# Patient Record
Sex: FEMALE | Race: BLACK OR AFRICAN AMERICAN | NOT HISPANIC OR LATINO | Employment: FULL TIME | URBAN - METROPOLITAN AREA
[De-identification: names, ages, dates, MRNs, and addresses within clinical notes are randomized per-mention and may not be internally consistent; named-entity substitution may affect disease eponyms.]

---

## 2020-11-21 ENCOUNTER — APPOINTMENT (OUTPATIENT)
Dept: CT IMAGING | Facility: CLINIC | Age: 20
End: 2020-11-21
Attending: EMERGENCY MEDICINE

## 2020-11-21 ENCOUNTER — HOSPITAL ENCOUNTER (OUTPATIENT)
Facility: CLINIC | Age: 20
Discharge: HOME | End: 2020-11-21
Attending: EMERGENCY MEDICINE | Admitting: EMERGENCY MEDICINE

## 2020-11-21 ENCOUNTER — NURSE TRIAGE (OUTPATIENT)
Dept: NURSING | Facility: CLINIC | Age: 20
End: 2020-11-21

## 2020-11-21 ENCOUNTER — SURGERY (OUTPATIENT)
Age: 20
End: 2020-11-21

## 2020-11-21 ENCOUNTER — ANESTHESIA (OUTPATIENT)
Dept: SURGERY | Facility: CLINIC | Age: 20
End: 2020-11-21

## 2020-11-21 ENCOUNTER — ANESTHESIA EVENT (OUTPATIENT)
Dept: SURGERY | Facility: CLINIC | Age: 20
End: 2020-11-21

## 2020-11-21 VITALS
HEART RATE: 88 BPM | WEIGHT: 145 LBS | OXYGEN SATURATION: 100 % | DIASTOLIC BLOOD PRESSURE: 69 MMHG | HEIGHT: 66 IN | RESPIRATION RATE: 10 BRPM | BODY MASS INDEX: 23.3 KG/M2 | SYSTOLIC BLOOD PRESSURE: 107 MMHG | TEMPERATURE: 98.4 F

## 2020-11-21 DIAGNOSIS — K35.80 ACUTE APPENDICITIS, UNSPECIFIED ACUTE APPENDICITIS TYPE: ICD-10-CM

## 2020-11-21 DIAGNOSIS — R10.31 ABDOMINAL PAIN, RIGHT LOWER QUADRANT: ICD-10-CM

## 2020-11-21 DIAGNOSIS — G89.18 ACUTE POST-OPERATIVE PAIN: Primary | ICD-10-CM

## 2020-11-21 LAB
ALBUMIN SERPL-MCNC: 3.7 G/DL (ref 3.4–5)
ALBUMIN UR-MCNC: 10 MG/DL
ALP SERPL-CCNC: 83 U/L (ref 40–150)
ALT SERPL W P-5'-P-CCNC: 17 U/L (ref 0–50)
ANION GAP SERPL CALCULATED.3IONS-SCNC: 7 MMOL/L (ref 3–14)
APPEARANCE UR: CLEAR
AST SERPL W P-5'-P-CCNC: 24 U/L (ref 0–45)
BACTERIA #/AREA URNS HPF: ABNORMAL /HPF
BASOPHILS # BLD AUTO: 0 10E9/L (ref 0–0.2)
BASOPHILS NFR BLD AUTO: 0.2 %
BILIRUB SERPL-MCNC: 0.5 MG/DL (ref 0.2–1.3)
BILIRUB UR QL STRIP: NEGATIVE
BUN SERPL-MCNC: 7 MG/DL (ref 7–30)
CALCIUM SERPL-MCNC: 8.8 MG/DL (ref 8.5–10.1)
CHLORIDE SERPL-SCNC: 107 MMOL/L (ref 94–109)
CO2 SERPL-SCNC: 22 MMOL/L (ref 20–32)
COLOR UR AUTO: YELLOW
CREAT SERPL-MCNC: 0.6 MG/DL (ref 0.52–1.04)
DIFFERENTIAL METHOD BLD: ABNORMAL
DIFFERENTIAL METHOD BLD: NORMAL
EOSINOPHIL # BLD AUTO: 0 10E9/L (ref 0–0.7)
EOSINOPHIL NFR BLD AUTO: 0.4 %
ERYTHROCYTE [DISTWIDTH] IN BLOOD BY AUTOMATED COUNT: 14.2 % (ref 10–15)
ERYTHROCYTE [DISTWIDTH] IN BLOOD BY AUTOMATED COUNT: NORMAL % (ref 10–15)
GFR SERPL CREATININE-BSD FRML MDRD: >90 ML/MIN/{1.73_M2}
GLUCOSE SERPL-MCNC: 85 MG/DL (ref 70–99)
GLUCOSE UR STRIP-MCNC: NEGATIVE MG/DL
HCG SERPL QL: NEGATIVE
HCT VFR BLD AUTO: 34.8 % (ref 35–47)
HCT VFR BLD AUTO: NORMAL % (ref 35–47)
HGB BLD-MCNC: 11.8 G/DL (ref 11.7–15.7)
HGB BLD-MCNC: NORMAL G/DL (ref 11.7–15.7)
HGB UR QL STRIP: NEGATIVE
IMM GRANULOCYTES # BLD: 0 10E9/L (ref 0–0.4)
IMM GRANULOCYTES NFR BLD: 0.2 %
KETONES UR STRIP-MCNC: 80 MG/DL
LABORATORY COMMENT REPORT: NORMAL
LEUKOCYTE ESTERASE UR QL STRIP: NEGATIVE
LIPASE SERPL-CCNC: 129 U/L (ref 73–393)
LYMPHOCYTES # BLD AUTO: 1.1 10E9/L (ref 0.8–5.3)
LYMPHOCYTES NFR BLD AUTO: 12 %
MCH RBC QN AUTO: 30.2 PG (ref 26.5–33)
MCH RBC QN AUTO: NORMAL PG (ref 26.5–33)
MCHC RBC AUTO-ENTMCNC: 33.9 G/DL (ref 31.5–36.5)
MCHC RBC AUTO-ENTMCNC: NORMAL G/DL (ref 31.5–36.5)
MCV RBC AUTO: 89 FL (ref 78–100)
MCV RBC AUTO: NORMAL FL (ref 78–100)
MONOCYTES # BLD AUTO: 0.8 10E9/L (ref 0–1.3)
MONOCYTES NFR BLD AUTO: 8.6 %
MUCOUS THREADS #/AREA URNS LPF: PRESENT /LPF
NEUTROPHILS # BLD AUTO: 7 10E9/L (ref 1.6–8.3)
NEUTROPHILS NFR BLD AUTO: 78.6 %
NITRATE UR QL: NEGATIVE
NRBC # BLD AUTO: 0 10*3/UL
NRBC BLD AUTO-RTO: 0 /100
PH UR STRIP: 7 PH (ref 5–7)
PLATELET # BLD AUTO: 290 10E9/L (ref 150–450)
PLATELET # BLD AUTO: NORMAL 10E9/L (ref 150–450)
POTASSIUM SERPL-SCNC: 3.8 MMOL/L (ref 3.4–5.3)
PROT SERPL-MCNC: 7.8 G/DL (ref 6.8–8.8)
RBC # BLD AUTO: 3.91 10E12/L (ref 3.8–5.2)
RBC # BLD AUTO: NORMAL 10E12/L (ref 3.8–5.2)
RBC #/AREA URNS AUTO: <1 /HPF (ref 0–2)
SARS-COV-2 RNA SPEC QL NAA+PROBE: NEGATIVE
SARS-COV-2 RNA SPEC QL NAA+PROBE: NORMAL
SODIUM SERPL-SCNC: 136 MMOL/L (ref 133–144)
SOURCE: ABNORMAL
SP GR UR STRIP: 1.03 (ref 1–1.03)
SPECIMEN SOURCE: NORMAL
SPECIMEN SOURCE: NORMAL
SQUAMOUS #/AREA URNS AUTO: 3 /HPF (ref 0–1)
UROBILINOGEN UR STRIP-MCNC: 2 MG/DL (ref 0–2)
WBC # BLD AUTO: 8.9 10E9/L (ref 4–11)
WBC # BLD AUTO: NORMAL 10E9/L (ref 4–11)
WBC #/AREA URNS AUTO: <1 /HPF (ref 0–5)

## 2020-11-21 PROCEDURE — 250N000009 HC RX 250: Performed by: EMERGENCY MEDICINE

## 2020-11-21 PROCEDURE — 250N000009 HC RX 250: Performed by: NURSE ANESTHETIST, CERTIFIED REGISTERED

## 2020-11-21 PROCEDURE — 258N000003 HC RX IP 258 OP 636: Performed by: EMERGENCY MEDICINE

## 2020-11-21 PROCEDURE — U0003 INFECTIOUS AGENT DETECTION BY NUCLEIC ACID (DNA OR RNA); SEVERE ACUTE RESPIRATORY SYNDROME CORONAVIRUS 2 (SARS-COV-2) (CORONAVIRUS DISEASE [COVID-19]), AMPLIFIED PROBE TECHNIQUE, MAKING USE OF HIGH THROUGHPUT TECHNOLOGIES AS DESCRIBED BY CMS-2020-01-R: HCPCS | Performed by: EMERGENCY MEDICINE

## 2020-11-21 PROCEDURE — 250N000011 HC RX IP 250 OP 636: Performed by: EMERGENCY MEDICINE

## 2020-11-21 PROCEDURE — 761N000002 HC RECOVERY PHASE 1 LEVEL 1 EA ADDTL HR: Performed by: SURGERY

## 2020-11-21 PROCEDURE — C9803 HOPD COVID-19 SPEC COLLECT: HCPCS

## 2020-11-21 PROCEDURE — 81001 URINALYSIS AUTO W/SCOPE: CPT | Performed by: EMERGENCY MEDICINE

## 2020-11-21 PROCEDURE — 88304 TISSUE EXAM BY PATHOLOGIST: CPT | Mod: 26 | Performed by: PATHOLOGY

## 2020-11-21 PROCEDURE — 761N000001 HC RECOVERY PHASE 1 LEVEL 1 FIRST HR: Performed by: SURGERY

## 2020-11-21 PROCEDURE — 272N000001 HC OR GENERAL SUPPLY STERILE: Performed by: SURGERY

## 2020-11-21 PROCEDURE — 258N000001 HC RX 258: Performed by: SURGERY

## 2020-11-21 PROCEDURE — 360N000021 HC SURGERY LEVEL 3 EA 15 ADDTL MIN: Performed by: SURGERY

## 2020-11-21 PROCEDURE — 99204 OFFICE O/P NEW MOD 45 MIN: CPT | Mod: 57 | Performed by: SURGERY

## 2020-11-21 PROCEDURE — 96375 TX/PRO/DX INJ NEW DRUG ADDON: CPT

## 2020-11-21 PROCEDURE — 999N000139 HC STATISTIC PRE-PROCEDURE ASSESSMENT II: Performed by: SURGERY

## 2020-11-21 PROCEDURE — 44970 LAPAROSCOPY APPENDECTOMY: CPT | Performed by: SURGERY

## 2020-11-21 PROCEDURE — 258N000003 HC RX IP 258 OP 636: Performed by: NURSE ANESTHETIST, CERTIFIED REGISTERED

## 2020-11-21 PROCEDURE — 88304 TISSUE EXAM BY PATHOLOGIST: CPT | Mod: TC | Performed by: SURGERY

## 2020-11-21 PROCEDURE — 250N000003 HC SEVOFLURANE, EA 15 MIN: Performed by: SURGERY

## 2020-11-21 PROCEDURE — 250N000011 HC RX IP 250 OP 636: Performed by: SURGERY

## 2020-11-21 PROCEDURE — 250N000013 HC RX MED GY IP 250 OP 250 PS 637: Performed by: PHYSICIAN ASSISTANT

## 2020-11-21 PROCEDURE — 370N000001 HC ANESTHESIA TECHNICAL FEE, 1ST 30 MIN: Performed by: SURGERY

## 2020-11-21 PROCEDURE — 96374 THER/PROPH/DIAG INJ IV PUSH: CPT | Mod: 59

## 2020-11-21 PROCEDURE — 44970 LAPAROSCOPY APPENDECTOMY: CPT | Mod: AS | Performed by: PHYSICIAN ASSISTANT

## 2020-11-21 PROCEDURE — 250N000009 HC RX 250: Performed by: SURGERY

## 2020-11-21 PROCEDURE — 83690 ASSAY OF LIPASE: CPT | Performed by: EMERGENCY MEDICINE

## 2020-11-21 PROCEDURE — 258N000003 HC RX IP 258 OP 636: Performed by: SURGERY

## 2020-11-21 PROCEDURE — 99285 EMERGENCY DEPT VISIT HI MDM: CPT | Mod: 25

## 2020-11-21 PROCEDURE — 250N000011 HC RX IP 250 OP 636: Performed by: NURSE ANESTHETIST, CERTIFIED REGISTERED

## 2020-11-21 PROCEDURE — 84703 CHORIONIC GONADOTROPIN ASSAY: CPT | Performed by: EMERGENCY MEDICINE

## 2020-11-21 PROCEDURE — 761N000007 HC RECOVERY PHASE 2 EACH 15 MINS: Performed by: SURGERY

## 2020-11-21 PROCEDURE — 360N000020 HC SURGERY LEVEL 3 1ST 30 MIN: Performed by: SURGERY

## 2020-11-21 PROCEDURE — 80053 COMPREHEN METABOLIC PANEL: CPT | Performed by: EMERGENCY MEDICINE

## 2020-11-21 PROCEDURE — 96361 HYDRATE IV INFUSION ADD-ON: CPT

## 2020-11-21 PROCEDURE — 85025 COMPLETE CBC W/AUTO DIFF WBC: CPT | Performed by: EMERGENCY MEDICINE

## 2020-11-21 PROCEDURE — 370N000002 HC ANESTHESIA TECHNICAL FEE, EACH ADDTL 15 MIN: Performed by: SURGERY

## 2020-11-21 PROCEDURE — 74177 CT ABD & PELVIS W/CONTRAST: CPT

## 2020-11-21 RX ORDER — LIDOCAINE HYDROCHLORIDE 20 MG/ML
INJECTION, SOLUTION INFILTRATION; PERINEURAL PRN
Status: DISCONTINUED | OUTPATIENT
Start: 2020-11-21 | End: 2020-11-21

## 2020-11-21 RX ORDER — HYDROCODONE BITARTRATE AND ACETAMINOPHEN 5; 325 MG/1; MG/1
1-2 TABLET ORAL EVERY 4 HOURS PRN
Qty: 18 TABLET | Refills: 0 | Status: SHIPPED | OUTPATIENT
Start: 2020-11-21 | End: 2020-12-22

## 2020-11-21 RX ORDER — KETOROLAC TROMETHAMINE 30 MG/ML
INJECTION, SOLUTION INTRAMUSCULAR; INTRAVENOUS PRN
Status: DISCONTINUED | OUTPATIENT
Start: 2020-11-21 | End: 2020-11-21

## 2020-11-21 RX ORDER — NALOXONE HYDROCHLORIDE 0.4 MG/ML
0.2 INJECTION, SOLUTION INTRAMUSCULAR; INTRAVENOUS; SUBCUTANEOUS
Status: CANCELLED | OUTPATIENT
Start: 2020-11-21

## 2020-11-21 RX ORDER — NALOXONE HYDROCHLORIDE 0.4 MG/ML
0.4 INJECTION, SOLUTION INTRAMUSCULAR; INTRAVENOUS; SUBCUTANEOUS
Status: CANCELLED | OUTPATIENT
Start: 2020-11-21

## 2020-11-21 RX ORDER — IOPAMIDOL 755 MG/ML
73 INJECTION, SOLUTION INTRAVASCULAR ONCE
Status: COMPLETED | OUTPATIENT
Start: 2020-11-21 | End: 2020-11-21

## 2020-11-21 RX ORDER — ERTAPENEM 1 G/1
1 INJECTION, POWDER, LYOPHILIZED, FOR SOLUTION INTRAMUSCULAR; INTRAVENOUS ONCE
Status: COMPLETED | OUTPATIENT
Start: 2020-11-21 | End: 2020-11-21

## 2020-11-21 RX ORDER — LABETALOL HYDROCHLORIDE 5 MG/ML
10 INJECTION, SOLUTION INTRAVENOUS
Status: DISCONTINUED | OUTPATIENT
Start: 2020-11-21 | End: 2020-11-21 | Stop reason: HOSPADM

## 2020-11-21 RX ORDER — SODIUM CHLORIDE, SODIUM LACTATE, POTASSIUM CHLORIDE, CALCIUM CHLORIDE 600; 310; 30; 20 MG/100ML; MG/100ML; MG/100ML; MG/100ML
INJECTION, SOLUTION INTRAVENOUS CONTINUOUS
Status: DISCONTINUED | OUTPATIENT
Start: 2020-11-21 | End: 2020-11-21 | Stop reason: HOSPADM

## 2020-11-21 RX ORDER — HYDROCODONE BITARTRATE AND ACETAMINOPHEN 5; 325 MG/1; MG/1
1-2 TABLET ORAL EVERY 4 HOURS PRN
Qty: 18 TABLET | Refills: 0 | Status: SHIPPED | OUTPATIENT
Start: 2020-11-21 | End: 2020-11-21

## 2020-11-21 RX ORDER — KETOROLAC TROMETHAMINE 30 MG/ML
30 INJECTION, SOLUTION INTRAMUSCULAR; INTRAVENOUS
Status: DISCONTINUED | OUTPATIENT
Start: 2020-11-21 | End: 2020-11-21 | Stop reason: HOSPADM

## 2020-11-21 RX ORDER — FENTANYL CITRATE 50 UG/ML
INJECTION, SOLUTION INTRAMUSCULAR; INTRAVENOUS PRN
Status: DISCONTINUED | OUTPATIENT
Start: 2020-11-21 | End: 2020-11-21

## 2020-11-21 RX ORDER — HYDROMORPHONE HYDROCHLORIDE 1 MG/ML
.3-.5 INJECTION, SOLUTION INTRAMUSCULAR; INTRAVENOUS; SUBCUTANEOUS EVERY 5 MIN PRN
Status: DISCONTINUED | OUTPATIENT
Start: 2020-11-21 | End: 2020-11-21 | Stop reason: HOSPADM

## 2020-11-21 RX ORDER — ONDANSETRON 2 MG/ML
4 INJECTION INTRAMUSCULAR; INTRAVENOUS EVERY 30 MIN PRN
Status: DISCONTINUED | OUTPATIENT
Start: 2020-11-21 | End: 2020-11-21 | Stop reason: HOSPADM

## 2020-11-21 RX ORDER — PROPOFOL 10 MG/ML
INJECTION, EMULSION INTRAVENOUS PRN
Status: DISCONTINUED | OUTPATIENT
Start: 2020-11-21 | End: 2020-11-21

## 2020-11-21 RX ORDER — HYDROCODONE BITARTRATE AND ACETAMINOPHEN 5; 325 MG/1; MG/1
1 TABLET ORAL
Status: COMPLETED | OUTPATIENT
Start: 2020-11-21 | End: 2020-11-21

## 2020-11-21 RX ORDER — FENTANYL CITRATE 50 UG/ML
25-50 INJECTION, SOLUTION INTRAMUSCULAR; INTRAVENOUS
Status: DISCONTINUED | OUTPATIENT
Start: 2020-11-21 | End: 2020-11-21 | Stop reason: HOSPADM

## 2020-11-21 RX ORDER — MORPHINE SULFATE 4 MG/ML
4 INJECTION, SOLUTION INTRAMUSCULAR; INTRAVENOUS
Status: DISCONTINUED | OUTPATIENT
Start: 2020-11-21 | End: 2020-11-21 | Stop reason: HOSPADM

## 2020-11-21 RX ORDER — CETIRIZINE HYDROCHLORIDE 10 MG/1
10 TABLET ORAL DAILY
COMMUNITY
End: 2022-09-27

## 2020-11-21 RX ORDER — ERTAPENEM 1 G/1
INJECTION, POWDER, LYOPHILIZED, FOR SOLUTION INTRAMUSCULAR; INTRAVENOUS PRN
Status: DISCONTINUED | OUTPATIENT
Start: 2020-11-21 | End: 2020-11-21

## 2020-11-21 RX ORDER — DEXAMETHASONE SODIUM PHOSPHATE 4 MG/ML
INJECTION, SOLUTION INTRA-ARTICULAR; INTRALESIONAL; INTRAMUSCULAR; INTRAVENOUS; SOFT TISSUE PRN
Status: DISCONTINUED | OUTPATIENT
Start: 2020-11-21 | End: 2020-11-21

## 2020-11-21 RX ORDER — ONDANSETRON 4 MG/1
4 TABLET, ORALLY DISINTEGRATING ORAL EVERY 30 MIN PRN
Status: DISCONTINUED | OUTPATIENT
Start: 2020-11-21 | End: 2020-11-21 | Stop reason: HOSPADM

## 2020-11-21 RX ORDER — AMOXICILLIN 250 MG
1 CAPSULE ORAL 2 TIMES DAILY
Qty: 15 TABLET | Refills: 0 | Status: SHIPPED | OUTPATIENT
Start: 2020-11-21 | End: 2020-11-21

## 2020-11-21 RX ORDER — KETOROLAC TROMETHAMINE 15 MG/ML
15 INJECTION, SOLUTION INTRAMUSCULAR; INTRAVENOUS ONCE
Status: COMPLETED | OUTPATIENT
Start: 2020-11-21 | End: 2020-11-21

## 2020-11-21 RX ORDER — MEPERIDINE HYDROCHLORIDE 25 MG/ML
12.5 INJECTION INTRAMUSCULAR; INTRAVENOUS; SUBCUTANEOUS EVERY 5 MIN PRN
Status: DISCONTINUED | OUTPATIENT
Start: 2020-11-21 | End: 2020-11-21 | Stop reason: HOSPADM

## 2020-11-21 RX ORDER — AMOXICILLIN 250 MG
1 CAPSULE ORAL 2 TIMES DAILY
Qty: 15 TABLET | Refills: 0 | Status: SHIPPED | OUTPATIENT
Start: 2020-11-21 | End: 2020-12-22

## 2020-11-21 RX ADMIN — SODIUM CHLORIDE 1000 ML: 9 INJECTION, SOLUTION INTRAVENOUS at 10:10

## 2020-11-21 RX ADMIN — MIDAZOLAM 2 MG: 1 INJECTION INTRAMUSCULAR; INTRAVENOUS at 13:19

## 2020-11-21 RX ADMIN — KETOROLAC TROMETHAMINE 15 MG: 15 INJECTION, SOLUTION INTRAMUSCULAR; INTRAVENOUS at 10:10

## 2020-11-21 RX ADMIN — FENTANYL CITRATE 50 MCG: 0.05 INJECTION, SOLUTION INTRAMUSCULAR; INTRAVENOUS at 15:21

## 2020-11-21 RX ADMIN — ONDANSETRON 4 MG: 2 INJECTION INTRAMUSCULAR; INTRAVENOUS at 15:33

## 2020-11-21 RX ADMIN — SODIUM CHLORIDE, POTASSIUM CHLORIDE, SODIUM LACTATE AND CALCIUM CHLORIDE: 600; 310; 30; 20 INJECTION, SOLUTION INTRAVENOUS at 13:19

## 2020-11-21 RX ADMIN — ERTAPENEM SODIUM 1 G: 1 INJECTION, POWDER, LYOPHILIZED, FOR SOLUTION INTRAMUSCULAR; INTRAVENOUS at 13:19

## 2020-11-21 RX ADMIN — ONDANSETRON 4 MG: 2 INJECTION INTRAMUSCULAR; INTRAVENOUS at 14:13

## 2020-11-21 RX ADMIN — SUCCINYLCHOLINE CHLORIDE 20 MG: 20 INJECTION, SOLUTION INTRAMUSCULAR; INTRAVENOUS; PARENTERAL at 13:30

## 2020-11-21 RX ADMIN — KETOROLAC TROMETHAMINE 15 MG: 30 INJECTION, SOLUTION INTRAMUSCULAR at 14:13

## 2020-11-21 RX ADMIN — SODIUM CHLORIDE 61 ML: 9 INJECTION, SOLUTION INTRAVENOUS at 11:14

## 2020-11-21 RX ADMIN — FENTANYL CITRATE 50 MCG: 50 INJECTION, SOLUTION INTRAMUSCULAR; INTRAVENOUS at 13:20

## 2020-11-21 RX ADMIN — SUGAMMADEX 200 MG: 100 INJECTION, SOLUTION INTRAVENOUS at 14:18

## 2020-11-21 RX ADMIN — ERTAPENEM SODIUM 1 G: 1 INJECTION, POWDER, LYOPHILIZED, FOR SOLUTION INTRAMUSCULAR; INTRAVENOUS at 12:23

## 2020-11-21 RX ADMIN — FENTANYL CITRATE 50 MCG: 0.05 INJECTION, SOLUTION INTRAMUSCULAR; INTRAVENOUS at 14:57

## 2020-11-21 RX ADMIN — ROCURONIUM BROMIDE 10 MG: 10 INJECTION INTRAVENOUS at 13:25

## 2020-11-21 RX ADMIN — FENTANYL CITRATE 50 MCG: 50 INJECTION, SOLUTION INTRAMUSCULAR; INTRAVENOUS at 13:25

## 2020-11-21 RX ADMIN — PROPOFOL 180 MG: 10 INJECTION, EMULSION INTRAVENOUS at 13:25

## 2020-11-21 RX ADMIN — SUCCINYLCHOLINE CHLORIDE 70 MG: 20 INJECTION, SOLUTION INTRAMUSCULAR; INTRAVENOUS; PARENTERAL at 13:25

## 2020-11-21 RX ADMIN — LIDOCAINE HYDROCHLORIDE 80 MG: 20 INJECTION, SOLUTION INFILTRATION; PERINEURAL at 13:25

## 2020-11-21 RX ADMIN — DEXAMETHASONE SODIUM PHOSPHATE 4 MG: 4 INJECTION, SOLUTION INTRA-ARTICULAR; INTRALESIONAL; INTRAMUSCULAR; INTRAVENOUS; SOFT TISSUE at 13:40

## 2020-11-21 RX ADMIN — DEXMEDETOMIDINE HYDROCHLORIDE 10 MCG: 100 INJECTION, SOLUTION INTRAVENOUS at 14:36

## 2020-11-21 RX ADMIN — HYDROCODONE BITARTRATE AND ACETAMINOPHEN 1 TABLET: 5; 325 TABLET ORAL at 15:24

## 2020-11-21 RX ADMIN — IOPAMIDOL 73 ML: 755 INJECTION, SOLUTION INTRAVENOUS at 11:14

## 2020-11-21 ASSESSMENT — ENCOUNTER SYMPTOMS
FEVER: 1
DIARRHEA: 0
COUGH: 0
SORE THROAT: 0
ABDOMINAL PAIN: 1
NAUSEA: 1

## 2020-11-21 ASSESSMENT — MIFFLIN-ST. JEOR: SCORE: 1444.47

## 2020-11-21 NOTE — ANESTHESIA CARE TRANSFER NOTE
Patient: Hortensia Umaña    Procedure(s):  APPENDECTOMY, LAPAROSCOPIC    Diagnosis: * No pre-op diagnosis entered *  Diagnosis Additional Information: No value filed.    Anesthesia Type:   General     Note:  Airway :Face Mask  Patient transferred to:PACU  Comments: Neuromuscular blockade reversed after TOF 4/4, spontaneous respirations, adequate tidal volumes, followed commands to voice, oropharynx suctioned with soft flexible catheter, extubated atraumatically, extubated with suction, airway patent after extubation.  Oxygen via facemask at 8 liters per minute to PACU. After extubation, patient remained in OR for 14 minutes until transport to PACU due to standard hospital COVID-19 precautions, Oxygen tubing connected to wall O2 in PACU, SpO2, NiBP, and EKG monitors and alarms on and functioning, Stephon Hugger warmer connected to patient gown, report on patient's clinical status given to PACU RN, RN questions answered.   Handoff Report: Identifed the Patient, Identified the Reponsible Provider, Reviewed the pertinent medical history, Discussed the surgical course, Reviewed Intra-OP anesthesia mangement and issues during anesthesia, Set expectations for post-procedure period and Allowed opportunity for questions and acknowledgement of understanding      Vitals: (Last set prior to Anesthesia Care Transfer)    CRNA VITALS  11/21/2020 1406 - 11/21/2020 1436      11/21/2020             Pulse:  99    SpO2:  100 %    Resp Rate (set):  10                Electronically Signed By: MARCI Zavala CRNA  November 21, 2020  2:36 PM

## 2020-11-21 NOTE — ANESTHESIA PREPROCEDURE EVALUATION
Anesthesia Pre-Procedure Evaluation    Patient: Hortensia Umaña   MRN: [id number] : 2000          Preoperative Diagnosis: * No pre-op diagnosis entered *    Procedure(s):  APPENDECTOMY, LAPAROSCOPIC    History reviewed. No pertinent past medical history.  History reviewed. No pertinent surgical history.  Allergies   Allergen Reactions     Peanuts [Nuts]      Seasonal Allergies      Social History     Tobacco Use     Smoking status: Never Smoker     Smokeless tobacco: Never Used   Substance Use Topics     Alcohol use: Yes     Comment: occ     Wt Readings from Last 1 Encounters:   No data found for Wt      Prior to Admission medications    Medication Sig Start Date End Date Taking? Authorizing Provider   cetirizine (ZYRTEC) 10 MG tablet Take 10 mg by mouth daily   Yes Reported, Patient   HYDROcodone-acetaminophen (NORCO) 5-325 MG tablet Take 1-2 tablets by mouth every 4 hours as needed for moderate to severe pain 20  Yes Nimco Taylor PA-C   senna-docusate (SENOKOT-S/PERICOLACE) 8.6-50 MG tablet Take 1 tablet by mouth 2 times daily for 7 days 20 Yes Nimco Taylor PA-C     Current Facility-Administered Medications Ordered in Epic   Medication Dose Route Frequency Last Rate Last Admin     ertapenem (INVanz) 1 g vial to attach to  mL bag  1 g Intravenous Once   1 g at 20 1223     morphine (PF) injection 4 mg  4 mg Intravenous Q15 Min PRN         ondansetron (ZOFRAN) injection 4 mg  4 mg Intravenous Q30 Min PRN         Current Outpatient Medications Ordered in Epic   Medication     cetirizine (ZYRTEC) 10 MG tablet     HYDROcodone-acetaminophen (NORCO) 5-325 MG tablet     senna-docusate (SENOKOT-S/PERICOLACE) 8.6-50 MG tablet       Recent Labs   Lab Test 20  0950      POTASSIUM 3.8   CHLORIDE 107   CO2 22   ANIONGAP 7   GLC 85   BUN 7   CR 0.60   CELSO 8.8     Recent Labs   Lab Test 20  1019 20  0950   WBC 8.9 Canceled, Test credited   HGB 11.8 Canceled,  Test credited    Canceled, Test credited     No results for input(s): ABO, RH in the last 77135 hours.  No results for input(s): TROPI in the last 34753 hours.  No results for input(s): PH, PCO2, PO2, HCO3 in the last 20498 hours.  No results for input(s): HCG in the last 80885 hours.  Recent Results (from the past 744 hour(s))   CT Abdomen Pelvis w Contrast    Narrative    CT ABDOMEN PELVIS W CONTRAST 11/21/2020 11:26 AM    CLINICAL HISTORY: diffuse lower abdominal pain, nausea, temp of 99.  Evaluate for appendicitis    TECHNIQUE: CT scan of the abdomen and pelvis was performed following  injection of IV contrast. Multiplanar reformats were obtained. Dose  reduction techniques were used.  CONTRAST: 73 mL Isovue-370    COMPARISON: None.    FINDINGS:   LOWER CHEST: Pulmonary nodules in the lung bases measuring 4 and 5 mm  (series 3 images 7 and 11) are technically indeterminate, but almost  surely benign in a patient this age and do not require further imaging  evaluation.    HEPATOBILIARY: Normal.    PANCREAS: Normal.    SPLEEN: Normal.    ADRENAL GLANDS: Normal.    KIDNEYS/BLADDER: Normal.    BOWEL: The appendix is dilated and thick-walled. There is a 7 mm  appendicolith within the appendiceal lumen. Mild periappendiceal  inflammation. No periappendiceal fluid collection. No free air.    The distal esophagus and stomach are normal in appearance. No small  bowel obstruction. Normal appearance of the colon.    PELVIC ORGANS: Normal.    LYMPH NODES: Prominent right lower quadrant lymph nodes are likely  reactive.    ADDITIONAL FINDINGS: Trace free fluid in the pelvis.    MUSCULOSKELETAL: Normal.      Impression    IMPRESSION:   1.  Dilated and thick-walled appendix with a calcified appendicolith  in the appendiceal lumen. Although there is only mild periappendiceal  inflammation, CT findings are consistent with acute appendicitis. No  periappendiceal abscess. No free air.    TEE CASAS MD     No results  "found for this or any previous visit (from the past 4320 hour(s)).      RECENT LABS:       Anesthesia Evaluation     .             ROS/MED HX    ENT/Pulmonary:  - neg pulmonary ROS     Neurologic:  - neg neurologic ROS     Cardiovascular:  - neg cardiovascular ROS       METS/Exercise Tolerance:  >4 METS   Hematologic:  - neg hematologic  ROS       Musculoskeletal:  - neg musculoskeletal ROS       GI/Hepatic:     (+) appendicitis,       Renal/Genitourinary:  - ROS Renal section negative       Endo:  - neg endo ROS       Psychiatric:  - neg psychiatric ROS       Infectious Disease:  - neg infectious disease ROS       Malignancy:         Other:                          Physical Exam  Normal systems: dental    Airway   Mallampati: I    Dental     Cardiovascular   Rhythm and rate: regular and normal      Pulmonary    breath sounds clear to auscultation            Lab Results   Component Value Date    WBC 8.9 11/21/2020    HGB 11.8 11/21/2020    HCT 34.8 (L) 11/21/2020     11/21/2020     11/21/2020    POTASSIUM 3.8 11/21/2020    CHLORIDE 107 11/21/2020    CO2 22 11/21/2020    BUN 7 11/21/2020    CR 0.60 11/21/2020    GLC 85 11/21/2020    CELSO 8.8 11/21/2020    ALBUMIN 3.7 11/21/2020    PROTTOTAL 7.8 11/21/2020    ALT 17 11/21/2020    AST 24 11/21/2020    ALKPHOS 83 11/21/2020    BILITOTAL 0.5 11/21/2020    LIPASE 129 11/21/2020    HCGS Negative 11/21/2020       Preop Vitals  BP Readings from Last 3 Encounters:   11/21/20 120/77    Pulse Readings from Last 3 Encounters:   11/21/20 107      Resp Readings from Last 3 Encounters:   11/21/20 18    SpO2 Readings from Last 3 Encounters:   11/21/20 100%      Temp Readings from Last 1 Encounters:   11/21/20 36.7  C (98  F) (Oral)    Ht Readings from Last 1 Encounters:   11/21/20 1.676 m (5' 6\")      Wt Readings from Last 1 Encounters:   No data found for Wt    There is no height or weight on file to calculate BMI.       Anesthesia Plan      History & Physical " Review  History and physical reviewed and following examination; no interval change.    ASA Status:  1 .    NPO Status:  Waived due to emergency    Plan for General (ETT RSI) with Intravenous and Propofol induction. Maintenance will be Balanced.    PONV prophylaxis:  Ondansetron (or other 5HT-3) and Dexamethasone or Solumedrol  Background propofol        Postoperative Care  Postoperative pain management:  IV analgesics.      Consents  Anesthetic plan, risks, benefits and alternatives discussed with:  Patient..                 Mikel Conroy MD

## 2020-11-21 NOTE — ANESTHESIA POSTPROCEDURE EVALUATION
Patient: Hortensia Umaña    Procedure(s):  APPENDECTOMY, LAPAROSCOPIC    Diagnosis:* No pre-op diagnosis entered *  Diagnosis Additional Information: No value filed.    Anesthesia Type:  General    Note:  Anesthesia Post Evaluation    Patient location during evaluation: PACU  Patient participation: Able to fully participate in evaluation  Level of consciousness: sleepy but conscious and responsive to verbal stimuli  Pain management: adequate  Airway patency: patent  Cardiovascular status: acceptable and hemodynamically stable  Respiratory status: acceptable and unassisted  Hydration status: acceptable  PONV: none     Anesthetic complications: None          Last vitals:  Vitals:    11/21/20 1445 11/21/20 1450 11/21/20 1500   BP: 103/67 107/71 109/67   Pulse: 99 92 84   Resp: 11 11 14   Temp: 36.3  C (97.3  F) 36.6  C (97.9  F) 37  C (98.6  F)   SpO2: 94% 95% 97%         Electronically Signed By: Mikel Conroy MD  November 21, 2020  3:06 PM

## 2020-11-21 NOTE — LETTER
November 21, 2020      To Whom It May Concern:      Hortensia Umaña was seen in our Surgery Department today, 11/21/20.  I expect her condition to improve over the next 7-10 days.  She may return to work/school when improved.No lifting greater than 15-20lbs for 2-3 weeks.     Sincerely,        Jennifer Sage RN

## 2020-11-21 NOTE — OR NURSING
VSS. A&O. Pain tolerable. Able to tolerate PO fluids. Incisions CDI. Discharge instructions, medications and follow up reviewed with patient and aunt. Questions answered. Discharged to home with aunt.

## 2020-11-21 NOTE — ED PROVIDER NOTES
"  History     Chief Complaint:  Abdominal Pain    HPI   Hortensia Umaña is a 20 year old female who presents with abdominal pain and nausea. Last night the patient started experiencing abdominal pain, nausea and a fever. She states that there is no chance that she is pregnant and denies any abnormal vaginal discharge, diarrhea, urinary symptoms, cough or sore throat.     Allergies:  Peanuts    Medications:    Medications reviewed. No pertinent medications.    Past Medical History:    Past medical history reviewed. No pertinent medical history.    Past Surgical History:    Surgical history reviewed. No pertinent surgical history.    Family History:    Family history reviewed. No pertinent family history.    Social History:  The patient was unaccompanied to the ED.    Review of Systems   Constitutional: Positive for fever.   HENT: Negative for sore throat.    Respiratory: Negative for cough.    Gastrointestinal: Positive for abdominal pain and nausea. Negative for diarrhea.   Genitourinary: Negative.    All other systems reviewed and are negative.    Physical Exam     Patient Vitals for the past 24 hrs:   BP Temp Temp src Pulse Resp SpO2 Height   11/21/20 0930 120/77 98  F (36.7  C) Oral 107 18 100 % 1.676 m (5' 6\")       Physical Exam  General: Sitting up in bed  Eyes:  The pupils are equal and round    Conjunctivae and sclerae are normal  ENT:    Wearing a mask  Neck:  Normal range of motion  CV:  Regular rate, regular rhythm    Skin warm and well perfused   Resp:  Non labored breathing on room air    No tachypnea    No cough heard  GI:  Abdomen is soft, there is no rigidity    No distension    No rebound tenderness     Mild diffuse lower abdominal tenderness  MS:  No back tenderness  Skin:  No rash or acute skin lesions noted  Neuro:   Awake, alert.      Speech is normal and fluent.    Face is symmetric.     Moves all extremities equally  Psych: Normal affect.  Appropriate interactions.    Emergency Department " Course     Imaging:  Radiology findings were communicated with the patient who voiced understanding of the findings.    CT Abdomen Pelvis w Contrast  1.  Dilated and thick-walled appendix with a calcified appendicolith  in the appendiceal lumen. Although there is only mild periappendiceal  inflammation, CT findings are consistent with acute appendicitis. No  periappendiceal abscess. No free air.  Reading per radiology    Laboratory:  Laboratory findings were communicated with the patient who voiced understanding of the findings.    UA with Microscopic: Ketones 80 (A), Protein Albumin 10 (A), Bacteria Few (A), Squamous Epithelial 3 (H), Mucous Urine Present (A) o/w Negative    CBC (Collected 1019): WBC 8.9, HGB 11.8,   CMP: WNL (Creatinine 0.60)    Lipase: 129    HCG Qualitative: Negative    Asymptomatic COVID-19 Virus (Coronavirus) by PCR Nasopharyngeal swab: Pending    Interventions:  1010 NS 1L IV Bolus  1010 Toradol 15mg IV    Emergency Department Course:    Past medical records, nursing notes, and vitals reviewed.  0940: I performed an exam of the patient and obtained history, as documented above.     IV was inserted and blood was drawn for laboratory testing, results above.    The patient provided a urine sample here in the emergency department. This was sent for laboratory testing, findings above.    The patient was sent for a CT while in the emergency department, findings above    1209: I rechecked and updated the patient.     1214: I spoke with Dr. Preston from surgery regarding this patient.    I personally reviewed the laboratory and imaging results with the Patient and answered all related questions prior to surgery.     Findings and plan explained to the Patient who consents to surgery.     Impression & Plan      Medical Decision Making:  Hortensia Umaña is a 20 year old female who presents to the emergency department today for evaluation of abdominal pain. Patient with lower abdominal pain.  Mild discomfort on exam. Labs unremarkable. She is not pregnant. UA showed no signs of infection. No abnormal vaginal discharge to suggest PID, STDs. Still having pain on recheck, so CT abdomen obtained. This shows appendicitis. Spoke with general surgery and will go to OR.    Covid-19  Hortensia Umaña was evaluated during a global COVID-19 pandemic, which necessitated consideration that the patient might be at risk for infection with the SARS-CoV-2 virus that causes COVID-19.   Applicable protocols for evaluation were followed during the patient's care.   COVID-19 was considered as part of the patient's evaluation. The plan for testing is:  a test was obtained during this visit.    Diagnosis:    ICD-10-CM    1. Acute appendicitis, unspecified acute appendicitis type  K35.80    2. Abdominal pain, right lower quadrant  R10.31        Disposition:   Send patient to OR.    Scribe Disclosure:  Primo PINTO, am serving as a scribe at 9:32 AM on 11/21/2020 to document services personally performed by Leticia Tafoya MD based on my observations and the provider's statements to me.  [facility] EMERGENCY DEPT       Leticia Tafoya MD  11/21/20 5869

## 2020-11-21 NOTE — ANESTHESIA PROCEDURE NOTES
Airway   Date/Time: 11/21/2020 1:28 PM   Patient location during procedure: OR    Staff -   CRNA: Heather Castaneda APRN CRNA  Performed By: CRNA    Consent for Airway   Urgency: elective    Indications and Patient Condition  Indications for airway management: roz-procedural  Mask difficulty assessment: 1 - vent by mask    Final Airway Details  Final airway type: endotracheal airway  Successful airway:ETT - single  Endotracheal Airway Details   ETT size (mm): 7.0  Cuffed: yes  Cuff volume (mL): 10  Successful intubation technique: video laryngoscopy  Grade View of Cords: 1  Adjucts: stylet  Measured from: gums/teeth  Secured at (cm): 21  Secured with: pink tape  Bite block used: None    Post intubation assessment   Placement verified by: capnometry, equal breath sounds and chest rise   Number of attempts at approach: 1  Secured with:pink tape  Ease of procedure: easy  Dentition: Intact and Unchanged

## 2020-11-21 NOTE — H&P
"General Surgery Consultation    Hortensia Umaña MRN# [id number]   Age: 20 year old YOB: 2000     Date of Admission:  11/21/2020    Reason for consult:            Abdominal pain       Requesting physician:            Dr. Tafoya                  Chief Complaint:   Abdominal pain     History is obtained from the patient         History of Present Illness:   This patient is a 20 year old -American female who presents with right lower quadrant abdominal pain for 1 day. She reports her pain started last night and persisted today. She has had mild nausea, no emesis. No similar pain in the past. No prior abdominal surgeries. She is otherwise healthy. Movement makes the pain worse. Low grade fever at home. No dysuria. Regular bowel movements.             Past Medical History:   allergies          Past Surgical History:   History reviewed. No pertinent surgical history.          Social History:     Social History     Tobacco Use     Smoking status: Never Smoker     Smokeless tobacco: Never Used   Substance Use Topics     Alcohol use: Yes     Comment: occ             Family History:   Reviewed         Allergies:     Allergies   Allergen Reactions     Peanuts [Nuts]      Seasonal Allergies              Medications:   No current facility-administered medications on file prior to encounter.        cetirizine (ZYRTEC) 10 MG tablet, Take 10 mg by mouth daily        ertapenem (INVanz) IV  1 g Intravenous Once            Review of Systems:   A 10 point Review of Systems is negative other than noted in the HPI.          Physical Exam:   /66   Pulse 107   Temp 98  F (36.7  C) (Oral)   Resp 16   Ht 1.676 m (5' 6\")   LMP 10/23/2020   SpO2 100%   General - Well developed, well nourished female in no apparent distress  Psych: normal affect, pleasant  HEENT:  Head normocephalic and atraumatic, pupils equal and round, conjunctivae clear, no scleral icterus, external ears and nose normal  Neck: Supple without " thyromegaly or masses  Lymphatic: No cervical, or supraclavicular lymphadenopathy  Lungs: Clear to auscultation bilaterally  Heart: regular rate and rhythm, no murmurs  Abdomen:   soft, flat, non-distended with moderate tenderness noted in the right lower quadrant. No rebound or guarding. no masses palpated.  Extremities: Warm without edema  Neurologic: nonfocal  Psychiatric: Mood and affect appropriate  Skin: Without lesions or rashes, or juandice         Data:     Lab Results   Component Value Date    WBC 8.9 11/21/2020     Lab Results   Component Value Date    HGB 11.8 11/21/2020     Lab Results   Component Value Date     11/21/2020     Last Basic Metabolic Panel:  Lab Results   Component Value Date     11/21/2020      Lab Results   Component Value Date    POTASSIUM 3.8 11/21/2020     Lab Results   Component Value Date    CHLORIDE 107 11/21/2020     Lab Results   Component Value Date    CELSO 8.8 11/21/2020     Lab Results   Component Value Date    CO2 22 11/21/2020     Lab Results   Component Value Date    BUN 7 11/21/2020     Lab Results   Component Value Date    CR 0.60 11/21/2020     Lab Results   Component Value Date    GLC 85 11/21/2020       Liver Function Studies -   Recent Labs   Lab Test 11/21/20  0950   PROTTOTAL 7.8   ALBUMIN 3.7   BILITOTAL 0.5   ALKPHOS 83   AST 24   ALT 17       All labs and imaging was personally reviewed.     Imaging:    Results for orders placed or performed during the hospital encounter of 11/21/20   CT Abdomen Pelvis w Contrast    Narrative    CT ABDOMEN PELVIS W CONTRAST 11/21/2020 11:26 AM    CLINICAL HISTORY: diffuse lower abdominal pain, nausea, temp of 99.  Evaluate for appendicitis    TECHNIQUE: CT scan of the abdomen and pelvis was performed following  injection of IV contrast. Multiplanar reformats were obtained. Dose  reduction techniques were used.  CONTRAST: 73 mL Isovue-370    COMPARISON: None.    FINDINGS:   LOWER CHEST: Pulmonary nodules in the lung  bases measuring 4 and 5 mm  (series 3 images 7 and 11) are technically indeterminate, but almost  surely benign in a patient this age and do not require further imaging  evaluation.    HEPATOBILIARY: Normal.    PANCREAS: Normal.    SPLEEN: Normal.    ADRENAL GLANDS: Normal.    KIDNEYS/BLADDER: Normal.    BOWEL: The appendix is dilated and thick-walled. There is a 7 mm  appendicolith within the appendiceal lumen. Mild periappendiceal  inflammation. No periappendiceal fluid collection. No free air.    The distal esophagus and stomach are normal in appearance. No small  bowel obstruction. Normal appearance of the colon.    PELVIC ORGANS: Normal.    LYMPH NODES: Prominent right lower quadrant lymph nodes are likely  reactive.    ADDITIONAL FINDINGS: Trace free fluid in the pelvis.    MUSCULOSKELETAL: Normal.      Impression    IMPRESSION:   1.  Dilated and thick-walled appendix with a calcified appendicolith  in the appendiceal lumen. Although there is only mild periappendiceal  inflammation, CT findings are consistent with acute appendicitis. No  periappendiceal abscess. No free air.    TEE CASAS MD             Assessment and Plan:   Assessment:   Hortensia Umaña is a 20 year old with acute appendicitis.       Plan:   I discussed the pathophysiology of appendicitis and the need for surgical intervention. I have also discussed the risks, benefits, complications including but not limited to bleeding, infection, possible injury to the bowel or ureter, possible anastomotic dehiscence, possible post operative abscess, possible postoperative MI, PE, or other anesthetic complications. If one of these complications did arise the patient could require further surgery. The patient thoroughly understood the conversation and will sign consent.     Laura Preston MD

## 2020-11-21 NOTE — ED NOTES
"[facility]  ED Nurse Handoff Report    ED Chief complaint: Abdominal Pain      ED Diagnosis:   Final diagnoses:   Acute appendicitis, unspecified acute appendicitis type   Abdominal pain, right lower quadrant       Code Status: Full Code    Allergies:   Allergies   Allergen Reactions     Peanuts [Nuts]      Seasonal Allergies        Patient Story: 20F  Focused Assessment:  Lower abd pain, fever. Has appendicitis     Treatments and/or interventions provided: abx and toradol  Patient's response to treatments and/or interventions: pain better    To be done/followed up on inpatient unit:  to pre-op    Does this patient have any cognitive concerns?: N/A    Activity level - Baseline/Home:  Independent  Activity Level - Current:   Independent    Patient's Preferred language: English   Needed?: No    Isolation: None  Infection: Not Applicable  Patient tested for COVID 19 prior to admission: YES  Bariatric?: No    Vital Signs:   Vitals:    11/21/20 0930   BP: 120/77   Pulse: 107   Resp: 18   Temp: 98  F (36.7  C)   TempSrc: Oral   SpO2: 100%   Height: 1.676 m (5' 6\")       Cardiac Rhythm:     Was the PSS-3 completed:   Yes  What interventions are required if any?               Family Comments:   OBS brochure/video discussed/provided to patient/family: N/A              Name of person given brochure if not patient:               Relationship to patient:     For the majority of the shift this patient's behavior was Green.   Behavioral interventions performed were .    ED NURSE PHONE NUMBER:          "

## 2020-11-21 NOTE — DISCHARGE INSTRUCTIONS
Today you received Toradol, an antiinflammatory medication similar to Ibuprofen.  You should not take other antiinflammatory medication, such as Ibuprofen, Motrin, Advil, Aleve, Naprosyn, etc until 8:15PM.     You were given the medication Sugammadex that may decrease effectiveness of birth control.  We recommend you use a backup method of birth control for 7 days after surgery.  Continue to take your hormonal contraceptive during this period.      Same Day Surgery Discharge Instructions for  Sedation and General Anesthesia       It's not unusual to feel dizzy, light-headed or faint for up to 24 hours after surgery or while taking pain medication.  If you have these symptoms: sit for a few minutes before standing and have someone assist you when you get up to walk or use the bathroom.      You should rest and relax for the next 24 hours. We recommend you make arrangements to have an adult stay with you for at least 24 hours after your discharge.  Avoid hazardous and strenuous activity.      DO NOT DRIVE any vehicle or operate mechanical equipment for 24 hours following the end of your surgery.  Even though you may feel normal, your reactions may be affected by the medication you have received.      Do not drink alcoholic beverages for 24 hours following surgery.       Slowly progress to your regular diet as you feel able. It's not unusual to feel nauseated and/or vomit after receiving anesthesia.  If you develop these symptoms, drink clear liquids (apple juice, ginger ale, broth, 7-up, etc. ) until you feel better.  If your nausea and vomiting persists for 24 hours, please notify your surgeon.        All narcotic pain medications, along with inactivity and anesthesia, can cause constipation. Drinking plenty of liquids and increasing fiber intake will help.      For any questions of a medical nature, call your surgeon.      Do not make important decisions for 24 hours.      If you had general anesthesia, you may have  a sore throat for a couple of days related to the breathing tube used during surgery.  You may use Cepacol lozenges to help with this discomfort.  If it worsens or if you develop a fever, contact your surgeon.       If you feel your pain is not well managed with the pain medications prescribed by your surgeon, please contact your surgeon's office to let them know so they can address your concerns.       CoVid 19 Information    We want to give you information regarding Covid. Please consult your primary care provider with any questions you might have.     Patient who have symptoms (cough, fever, or shortness of breath), need to isolate for 7 days from when symptoms started OR 72 hours after fever resolves (without fever reducing medications) AND improvement of respiratory symptoms (whichever is longer).      Isolate yourself at home (in own room/own bathroom if possible)    Do Not allow any visitors    Do Not go to work or school    Do Not go to [employer],  centers, shopping, or other public places.    Do Not shake hands.    Avoid close and intimate contact with others (hugging, kissing).    Follow CDC recommendations for household cleaning of frequently touched services.     After the initial 7 days, continue to isolate yourself from household members as much as possible. To continue decrease the risk of community spread and exposure, you and any members of your household should limit activities in public for 14 days after starting home isolation.     You can reference the following CDC link for helpful home isolation/care tips:  https://www.cdc.gov/coronavirus/2019-ncov/downloads/10Things.pdf    Protect Others:    Cover Your Mouth and Nose with a mask, disposable tissue or wash cloth to avoid spreading germs to others.    Wash your hands and face frequently with soap and water    Call Your Primary Doctor If: Breathing difficulty develops or you become worse.    For more information about COVID19 and options  for caring for yourself at home, please visit the CDC website at https://www.cdc.gov/coronavirus/2019-ncov/about/steps-when-sick.html  For more options for care at [facility], please visit our website at [email or web address]        [facility]  Discharge Instructions: Post-Operative Laparoscopic Appendectomy    ACTIVITY    Expect to feel tired after your surgery.  This will gradually resolve.      Take frequent, short walks and increase your activity gradually.      Avoid strenuous physical activity or heavy lifting greater than 15-20 lbs. for 2-3 weeks.  You may climb stairs.    You may drive without restrictions when you are not using any prescription pain medication and feel comfortable in a car.    You may return to work/school when you are comfortable without any prescription pain medication.    WOUND CARE    You may remove your outer dressing or Band-Aids and shower 48 hours after the surgery.  Pat your incisions dry and leave them open to air.  Re-apply dressing (Band-Aids or gauze/tape) as needed for comfort or drainage.    You may have steri-strips (looks like white tape) on your incision.  You may peel off the steri-strips 2 weeks after your surgery if they have not peeled off on their own.     Do not soak your incisions in a tub or pool for 2 weeks.     Do not apply any lotions, creams, or ointments to your incisions.    A ridge under your incisions is normal and will gradually resolve.    DIET    Start with liquids, then gradually resume your regular diet as tolerated.  Avoid heavy, spicy, and greasy meals for 2-3 days.    Drink plenty of fluids to stay hydrated.    PAIN***    Expect some tenderness and discomfort at the incision sites.  Use the prescribed pain medication at your discretion.  Expect gradual resolution of your pain over several days.    You may take ibuprofen with food (unless you have been told not to) instead of or in  addition to your prescribed pain medication.  If you are taking Norco or Percocet, do not take any additional acetaminophen/Tylenol.    Do not drink alcohol or drive while you are taking pain medications.    You may apply ice to your incisions in 20 minute intervals as needed for the next 48 hours.  After that time, consider switching to heat if you prefer.    EXPECTATIONS    Pain medications can cause constipation.  Limit use when possible.  Take over the counter stool softener/stimulant, such as Colace or Senna, 1-2 times a day with plenty of water.  You may take a mild over the counter laxative, such as Miralax or a suppository, as needed.  You make discontinue these medications once you are having regular bowel movements and/or are no longer taking your narcotic pain medication.     You may have shoulder or upper back discomfort due to the gas used in surgery.  This is temporary and should resolve in 48-72 hours.  Short, frequent walks may help with this.    If you are unable to urinate, or feel as though you are not emptying your bladder adequately, we recommend you call our office and/or seek care at an ER or Urgent Care facility if after hours.    FOLLOW UP    Our office will contact you in approximately 2 weeks to check on your progress and answer any questions you may have.  If you are doing well, you will not need to return for a follow up appointment.  If any concerns are identified over the phone, we will help you make an appointment to see a provider.     If you have not received a phone call, have any questions or concerns, or would like to be seen, please call us at [phone] and ask to speak with our nurse.  We are located at [address] [place] [address].    CALL OUR OFFICE -[phone] IF YOU HAVE:     Chills or fever above 101 F.    Increased redness, warmth, or drainage at your incisions.    Significant bleeding.    Pain not relieved by your pain medication or  rest.    Increasing pain after the first 48 hours.    Any other concerns or questions.           Revised September 2020

## 2020-11-21 NOTE — TELEPHONE ENCOUNTER
"Triage Call:    Pt calling regarding lower abdominal pain.     1. LOCATION: \"Where does it hurt?\"       Lower abdomen pain on both sides/middle    2. RADIATION: \"Does the pain shoot anywhere else?\" (e.g., chest, back)      No but patient also has lower back pain    3. ONSET: \"When did the pain begin?\" (e.g., minutes, hours or days ago)       Since 4pm yesterday    4. SUDDEN: \"Gradual or sudden onset?\"      Gradually  5. PATTERN \"Do  es the pain come and go, or is it constant?\"     - If constant: \"Is it getting better, staying the same, or worsening?\"       (Note: Constant means the pain never goes away completely; most serious pain is constant and it progresses)         Constant, worsening since last night.     6. SEVERITY: \"How bad is the pain?\"  (e.g., Scale 1-10; mild, moderate, or severe)    - MILD (1-3): doesn't interfere with normal activities, abdomen soft and not tender to touch     - MODERATE (4-7): interferes with normal activities or awakens from sleep, tender to touch     - SEVERE (8-10): excruciating pain, doubled over, unable to do any normal activities   Moderate to severe pain.     7. RECURRENT SYMPTOM: \"Have you ever had this type of abdominal pain before?\" If so, ask: \"When was the last time?\" and \"What happened that time?\"       No    8. CAUSE: \"What do you think is causing the abdominal pain?\"      Unknown.    9. RELIEVING/AGGRAVATING FACTORS: \"What makes it better or worse?\" (e.g., movement, antacids, bowel movement)      No    10. OTHER SYMPTOMS: \"Has there been any vomiting, diarrhea, constipation, or urine problems?\"        Nausea    11. PREGNANCY: \"Is there any chance you are pregnant?\" \"When was your last menstrual period?\"        No- LMP 11/23/2020      Pt was advised of protocol recommendation/disposition of see HCP within 4 hours (or PCP triage).     Dorie Cooley RN 11/21/20 8:22 AM  [facility] Nurse Advisor    COVID 19 Nurse Triage Plan/Patient Instructions    Please be aware " that novel coronavirus (COVID-19) may be circulating in the community. If you develop symptoms such as fever, cough, or SOB or if you have concerns about the presence of another infection including coronavirus (COVID-19), please contact your health care provider or visit [email or web address].     Disposition/Instructions    In-Person Visit with provider recommended. Reference Visit Selection Guide.  ED Visit recommended. Follow protocol based instructions.     Bring Your Own Device:  Please also bring your smart device(s) (smart phones, tablets, laptops) and their charging cables for your personal use and to communicate with your care team during your visit.    Thank you for taking steps to prevent the spread of this virus.  o Limit your contact with others.  o Wear a simple mask to cover your cough.  o Wash your hands well and often.    Resources    [facility]: About COVID-19: [email or web address]    CDC: What to Do If You're Sick: www.cdc.gov/coronavirus/2019-ncov/about/steps-when-sick.html    CDC: Ending Home Isolation: www.cdc.gov/coronavirus/2019-ncov/hcp/disposition-in-home-patients.html     CDC: Caring for Someone: www.cdc.gov/coronavirus/2019-ncov/if-you-are-sick/care-for-someone.html     [facility]: Interim Guidance for Hospital Discharge to Home: www.health.[facility].[place]/diseases/coronavirus/hcp/hospdischarge.pdf    [facility] clinical trials (COVID-19 research studies): clinicalaffairs.[facility]-clinical-trials     Below are the COVID-19 hotlines at the [place] Department of Health ([facility]). Interpreters are available.   o For health questions: Call 969-421-7512 or 1-470.687.6358 (7 a.m. to 7 p.m.)  o For questions about schools and childcare: Call 602-947-4301 or 1-160.933.3613 (7 a.m. to 7 p.m.)                   Additional Information    Negative: Shock suspected (e.g., cold/pale/clammy skin, too weak to stand, low BP, rapid pulse)    Negative: Difficult to awaken or acting confused  "(e.g., disoriented, slurred speech)    Negative: Passed out (i.e., lost consciousness, collapsed and was not responding)    Negative: Sounds like a life-threatening emergency to the triager    Negative: Chest pain    Negative: Pain is mainly in upper abdomen  (if needed ask: \"is it mainly above the belly button?\")    Negative: Followed an abdomen (stomach) injury    Negative: [1] Abdominal pain AND [2] pregnant < 20 weeks    Negative: [1] Abdominal pain AND [2] pregnant > 20 weeks    Negative: [1] Abdominal pain AND [2] postpartum (from 0 to 6 weeks after delivery)    Negative: [1] SEVERE pain AND [2] age > 60    Negative: [1] SEVERE pain (e.g., excruciating) AND [2] present > 1 hour    Negative: [1] Vomiting AND [2] contains red blood or black (\"coffee ground\") material  (Exception: few red streaks in vomit that only happened once)    Negative: Blood in bowel movements   (Exception: blood on surface of BM with constipation)    Negative: Black or tarry bowel movements  (Exception: chronic-unchanged  black-grey bowel movements AND is taking iron pills or Pepto-bismol)    Negative: Patient sounds very sick or weak to the triager    [1] MILD-MODERATE pain AND [2] constant AND [3] present > 2 hours    Protocols used: ABDOMINAL PAIN - FEMALE-A-AH      "

## 2020-11-21 NOTE — OP NOTE
PREOPERATIVE DIAGNOSIS: Acute appendicitis.      POSTOPERATIVE DIAGNOSIS: Acute appendicitis.      PROCEDURE: Laparoscopic appendectomy.      SURGEON: Laura Preston MD     ASSISTANT:  Nimco Taylor PA-C  The physician s assistant was medically necessary for their expertise in camera management, suctioning and retraction.    ANESTHESIA: GET.     COMPLICATIONS: None.     BLOOD LOSS: Minimal.     FINDINGS: Acute appendicitis, no perforation.     INDICATIONS:Hortensia Umaña is a 20 year old  with one day history of abdominal pain. An abdominal CT was performed which showed acute appendicitis. I explained the risks, benefits, complications including but not limited to bleeding, infection, possible need to open, possible postop hematoma, seroma, bowel or bladder injury, all which require additional procedures. The patient did agree and did sign consent.       DETAILS OF PROCEDURE: The patient was brought to the operating room per anesthesia, placed in supine position, intubated without difficulty. They were given perioperative antibiotics.  The patient was prepped and draped in the usual fashion using ChloraPrep and the surgical timeout was then performed, verifying the correct surgeon, site, procedure and patient. All in the room were in agreement.    A 5mm 0 degree laparoscope was inserted and the visiport used to obtain entrance to the abdomen. Insufflation was connected and the abdomen insufflated. Initial evaluation of the abdomen revealed mild inflammation in the right lower quadrant and revealed no trocar injuries. The abdomen was insufflated with pressure of 15, which the patient tolerated well, a 2nd 5mm port was placed suprapubically and a 12mm port placed infraumbilically under direct visualization. The appendix was found and appeared indurated and hyperemic with fibrinous exudate present on the surface. It was not adherent to the lateral wall. The base of the cecum was not  thickened. The terminal ileum was  normal. There was no perforation. Ligasure was used to divide the mesoappendix.   An Endo-MIGUEL ANGEL blue load was fired across the base of the appendix without issues. The staple line was inspected and found to be hemostatic.  The appendix was placed an EndoCatch bag and removed through the umbilical trocar. The area was explored. Staple lines were completely intact. There was no bleeding. The right lower quadrant was irrigated with saline and suctioned.The pelvis showed no acute findings, there was a small amount of murky fluid in the pelvis but no purulent fluid. The uterus was secondarily hyperemic. The fascia was closed with an 0 vicryl sutures using the gunner phi device in a figure of eight fashion.  All trocars were taken out under direct visualization.     Marcaine 0.5% injected to all the wounds. They were irrigated and closed with 4-0 Monocryl in subcuticular fashion.Steri strips and bandaids were applied. The patient tolerated the procedure well and was awoken from anesthesia and transferred to PACU in stable condition. All sponge, instrument, and needle counts were correct at the conclusion of the procedure.      Laura Preston MD  [facility]  [phone]

## 2020-11-24 LAB — COPATH REPORT: NORMAL

## 2020-11-30 ENCOUNTER — TELEPHONE (OUTPATIENT)
Dept: SURGERY | Facility: PHYSICIAN GROUP | Age: 20
End: 2020-11-30

## 2020-11-30 NOTE — TELEPHONE ENCOUNTER
"Procedure:  Laparoscopic appendectomy    Date:  11/21/2020    Surgeon:  Kary    Patient reporting rash \"all over\" She reports primarily on her arms.  Very itchy. She has not tried any interventions to alleviate the symptoms.    She is not on abx, and has not had narcotic for two days.  Informed her it could be from the hydrocodone.  If she takes it again and notices a rash, she needs to have it added to her allergy list.    No issues with breathing. Still some post op pain.    Encouraged her to try oral benadryl as well as topical anti itch cream like hydrocortisone or benadryl. If no relief,she should contact pcp for further recommendations.    She verbalized understanding and agreed.    Ritu Gill RN-BSN    "

## 2020-11-30 NOTE — TELEPHONE ENCOUNTER
Patient had a lap appy 11/21/20 SEW, was taking Hydrocodone, but that is done. Is having a rash all over her body    Phone: [phone]    Message ok

## 2020-12-10 ENCOUNTER — TELEPHONE (OUTPATIENT)
Dept: SURGERY | Facility: CLINIC | Age: 20
End: 2020-12-10

## 2020-12-10 NOTE — TELEPHONE ENCOUNTER
[facility]  Post op call note     Hortensia Umaña was called for an update regarding her recovery.  She underwent a laparoscopic appendectomy by Dr. Preston on 11/21/20.  Today she tells me she is doing better.  A little sore after returning to work.  She states her rash has resolved.  The pathology revealed acute appendicitis without perforation or atypical cellular proliferations.  This was discussed with the patient. We discussed the incidental findings of pulmonary nodules and I instructed her to establish a PCP and discuss this with them further. The patient states she understands our discussion.  The patient agrees to follow up in clinic as needed and call with any questions or concerns.      Nimco Taylor PA-C

## 2020-12-18 ENCOUNTER — NURSE TRIAGE (OUTPATIENT)
Dept: NURSING | Facility: CLINIC | Age: 20
End: 2020-12-18

## 2020-12-18 ENCOUNTER — HOSPITAL ENCOUNTER (EMERGENCY)
Facility: CLINIC | Age: 20
Discharge: HOME | End: 2020-12-18
Attending: EMERGENCY MEDICINE | Admitting: EMERGENCY MEDICINE

## 2020-12-18 VITALS
OXYGEN SATURATION: 98 % | HEIGHT: 66 IN | HEART RATE: 77 BPM | WEIGHT: 145 LBS | DIASTOLIC BLOOD PRESSURE: 72 MMHG | RESPIRATION RATE: 16 BRPM | BODY MASS INDEX: 23.3 KG/M2 | SYSTOLIC BLOOD PRESSURE: 115 MMHG

## 2020-12-18 DIAGNOSIS — R10.84 ABDOMINAL PAIN, GENERALIZED: ICD-10-CM

## 2020-12-18 LAB
ALBUMIN SERPL-MCNC: 4.2 G/DL (ref 3.4–5)
ALBUMIN UR-MCNC: 30 MG/DL
ALP SERPL-CCNC: 95 U/L (ref 40–150)
ALT SERPL W P-5'-P-CCNC: 18 U/L (ref 0–50)
ANION GAP SERPL CALCULATED.3IONS-SCNC: 3 MMOL/L (ref 3–14)
APPEARANCE UR: CLEAR
AST SERPL W P-5'-P-CCNC: 10 U/L (ref 0–45)
BACTERIA #/AREA URNS HPF: ABNORMAL /HPF
BASOPHILS # BLD AUTO: 0.1 10E9/L (ref 0–0.2)
BASOPHILS NFR BLD AUTO: 0.9 %
BILIRUB SERPL-MCNC: 0.2 MG/DL (ref 0.2–1.3)
BILIRUB UR QL STRIP: NEGATIVE
BUN SERPL-MCNC: 17 MG/DL (ref 7–30)
CALCIUM SERPL-MCNC: 9.1 MG/DL (ref 8.5–10.1)
CHLORIDE SERPL-SCNC: 107 MMOL/L (ref 94–109)
CO2 SERPL-SCNC: 26 MMOL/L (ref 20–32)
COLOR UR AUTO: YELLOW
CREAT SERPL-MCNC: 0.78 MG/DL (ref 0.52–1.04)
DIFFERENTIAL METHOD BLD: NORMAL
EOSINOPHIL # BLD AUTO: 0.1 10E9/L (ref 0–0.7)
EOSINOPHIL NFR BLD AUTO: 2.3 %
ERYTHROCYTE [DISTWIDTH] IN BLOOD BY AUTOMATED COUNT: 13.9 % (ref 10–15)
GFR SERPL CREATININE-BSD FRML MDRD: >90 ML/MIN/{1.73_M2}
GLUCOSE SERPL-MCNC: 85 MG/DL (ref 70–99)
GLUCOSE UR STRIP-MCNC: NEGATIVE MG/DL
HCT VFR BLD AUTO: 38.3 % (ref 35–47)
HGB BLD-MCNC: 12.5 G/DL (ref 11.7–15.7)
HGB UR QL STRIP: NEGATIVE
IMM GRANULOCYTES # BLD: 0 10E9/L (ref 0–0.4)
IMM GRANULOCYTES NFR BLD: 0.2 %
KETONES UR STRIP-MCNC: NEGATIVE MG/DL
LEUKOCYTE ESTERASE UR QL STRIP: NEGATIVE
LYMPHOCYTES # BLD AUTO: 2.5 10E9/L (ref 0.8–5.3)
LYMPHOCYTES NFR BLD AUTO: 42.9 %
MCH RBC QN AUTO: 28.9 PG (ref 26.5–33)
MCHC RBC AUTO-ENTMCNC: 32.6 G/DL (ref 31.5–36.5)
MCV RBC AUTO: 89 FL (ref 78–100)
MONOCYTES # BLD AUTO: 0.4 10E9/L (ref 0–1.3)
MONOCYTES NFR BLD AUTO: 7.7 %
MUCOUS THREADS #/AREA URNS LPF: PRESENT /LPF
NEUTROPHILS # BLD AUTO: 2.6 10E9/L (ref 1.6–8.3)
NEUTROPHILS NFR BLD AUTO: 46 %
NITRATE UR QL: NEGATIVE
NRBC # BLD AUTO: 0 10*3/UL
NRBC BLD AUTO-RTO: 0 /100
PH UR STRIP: 6 PH (ref 5–7)
PLATELET # BLD AUTO: 385 10E9/L (ref 150–450)
POTASSIUM SERPL-SCNC: 3.8 MMOL/L (ref 3.4–5.3)
PROT SERPL-MCNC: 8.3 G/DL (ref 6.8–8.8)
RBC # BLD AUTO: 4.33 10E12/L (ref 3.8–5.2)
RBC #/AREA URNS AUTO: 1 /HPF (ref 0–2)
SODIUM SERPL-SCNC: 136 MMOL/L (ref 133–144)
SOURCE: ABNORMAL
SP GR UR STRIP: 1.03 (ref 1–1.03)
SQUAMOUS #/AREA URNS AUTO: 3 /HPF (ref 0–1)
UROBILINOGEN UR STRIP-MCNC: 2 MG/DL (ref 0–2)
WBC # BLD AUTO: 5.7 10E9/L (ref 4–11)
WBC #/AREA URNS AUTO: 2 /HPF (ref 0–5)

## 2020-12-18 PROCEDURE — 81001 URINALYSIS AUTO W/SCOPE: CPT | Performed by: EMERGENCY MEDICINE

## 2020-12-18 PROCEDURE — 80053 COMPREHEN METABOLIC PANEL: CPT | Performed by: EMERGENCY MEDICINE

## 2020-12-18 PROCEDURE — 250N000011 HC RX IP 250 OP 636: Performed by: EMERGENCY MEDICINE

## 2020-12-18 PROCEDURE — 81025 URINE PREGNANCY TEST: CPT | Performed by: EMERGENCY MEDICINE

## 2020-12-18 PROCEDURE — 96374 THER/PROPH/DIAG INJ IV PUSH: CPT

## 2020-12-18 PROCEDURE — 85025 COMPLETE CBC W/AUTO DIFF WBC: CPT | Performed by: EMERGENCY MEDICINE

## 2020-12-18 PROCEDURE — 99284 EMERGENCY DEPT VISIT MOD MDM: CPT | Mod: 25

## 2020-12-18 RX ORDER — KETOROLAC TROMETHAMINE 15 MG/ML
15 INJECTION, SOLUTION INTRAMUSCULAR; INTRAVENOUS ONCE
Status: COMPLETED | OUTPATIENT
Start: 2020-12-18 | End: 2020-12-18

## 2020-12-18 RX ADMIN — KETOROLAC TROMETHAMINE 15 MG: 15 INJECTION, SOLUTION INTRAMUSCULAR; INTRAVENOUS at 22:50

## 2020-12-18 ASSESSMENT — MIFFLIN-ST. JEOR: SCORE: 1444.47

## 2020-12-18 NOTE — TELEPHONE ENCOUNTER
Triage Call:    Patient states she had her appendix removed on November 21.   Pt still having pain. Sharp pain with moving or bending down.   Pt states she has been taking Tylenol and Ibuprofen with no improvement.  Pt states the pains got better but then came back after going back to work. Pain now the past 4 days.   Incisions feel hard, one at naval is crusty. Pt states they look like they are healing. No increased warmth or redness.   Denies fever.   Rates pain as mild.     No primary physician.  Should patient schedule with general surgery for reevaluation or should patient schedule with a FP physician?  Please advise as patient currently does not have a primary doctor.     Due to mild pain not relieved by pain medications patient to have callback by PCP today.  RN will route to general surgery to advise if patient should be seen at their office or if she should be seen by FP.     Dorie Cooley RN 12/18/20 11:42 AM  [facility] Nurse Advisor    Additional Information    Negative: Chest pain    Negative: Difficulty breathing    Negative: Surgical incision symptoms and questions    Negative: Discomfort (pain, burning or stinging) when passing urine and male    Negative: Discomfort (pain, burning or stinging) when passing urine and female    Negative: Constipation    Negative: New or worsening leg (calf, thigh) pain    Negative: New or worsening leg swelling    Negative: Dizziness is severe, or persists > 24 hours after surgery    Negative: Symptoms arising from use of a urinary catheter (Contreras or Coude)    Negative: Cast problems or questions    Negative: Sounds like a life-threatening emergency to the triager    Negative: Bright red, wide-spread, sunburn-like rash    Negative: SEVERE headache and after spinal (epidural) anesthesia    Negative: Vomiting and persists > 4 hours    Negative: Vomiting and abdomen looks much more swollen than usual    Negative: Drinking very little and dehydration suspected  (e.g., no urine > 12 hours, very dry mouth, very lightheaded)    Negative: Patient sounds very sick or weak to the triager    Negative: Sounds like a serious complication to the triager    Negative: Fever > 100.4 F (38.0 C)    Negative: Caller has URGENT question and triager unable to answer question    Negative: Headache and after spinal (epidural) anesthesia and not severe    Negative: SEVERE post-op pain (e.g., excruciating, pain scale 8-10) that is not controlled with pain medications    Negative: Fever present > 3 days (72 hours)    MILD TO MODERATE post-op pain (e.g., pain scale 1-7) that is not controlled with pain medications    Patient wants to be seen    Protocols used: POST-OP SYMPTOMS AND [id number]-B-OR

## 2020-12-18 NOTE — TELEPHONE ENCOUNTER
Per Dr. Preston, patient should see her in clinic on Tuesday (22nd) at 0815.    Called patient to discuss. She verbalized understanding.    Advised her to present to ED over the weekend with worsening symptoms.    She agreed.    Ritu Gill, FELICITY-BSN

## 2020-12-18 NOTE — ED AVS SNAPSHOT
[facility] Emergency Dept  [address] [address]  Phone: [phone]  Fax: [phone]                                    Hortensia Umaña   MRN: [id number]    Department: [facility] Emergency Dept   Date of Visit: 12/18/2020           After Visit Summary Signature Page    I have received my discharge instructions, and my questions have been answered. I have discussed any challenges I see with this plan with the nurse or doctor.    ..........................................................................................................................................  Patient/Patient Representative Signature      ..........................................................................................................................................  Patient Representative Print Name and Relationship to Patient    ..................................................               ................................................  Date                                   Time    ..........................................................................................................................................  Reviewed by Signature/Title    ...................................................              ..............................................  Date                                               Time          22EPIC Rev 08/18

## 2020-12-19 LAB — HCG UR QL: NEGATIVE

## 2020-12-19 ASSESSMENT — ENCOUNTER SYMPTOMS
APPETITE CHANGE: 0
ABDOMINAL PAIN: 1
HEMATURIA: 0
VOMITING: 0
CHILLS: 0
FEVER: 0
FREQUENCY: 0
DYSURIA: 0

## 2020-12-19 NOTE — ED PROVIDER NOTES
"  History   Chief Complaint:  Abdominal Pain       HPI   Hortensia Umaña is a 20 year old female with history of asthma s/p laparoscopic appendectomy performed on 11/21 who presents with abdominal pain. The patient reported that her sharp abdominal pain returned 4 days ago. She had briefly felt this pain before right after her surgery especially with sneezing but this had gone away. She returned to light duty at her work as a  but being at work and bending and lifting exacerbated her pain. It was manageable with Tylenol, ibuprofen, and ice packs and again, it resolved. This time her pain returned it has been constant. She presented to the ED tonight to clarify if her pain is part of the healing process or if something more worrisome is going on. She denied any fever, chills, vomiting, changes in PO intake, dysuria, or other urinary complaints. She notes some new white colored vaginal discharge but she has never been sexually active. Of note, she has a follow up appointment with her surgeon in 3 days.     Review of Systems   Constitutional: Negative for appetite change, chills and fever.   Gastrointestinal: Positive for abdominal pain. Negative for vomiting.   Genitourinary: Negative for dysuria, frequency and hematuria.   All other systems reviewed and are negative.      Allergies:  Peanuts [Nuts]  Seasonal Allergies    Medications:  Zyrtec   Norco   Senna    Past Medical History:    Asthma    Past Surgical History:    Laparoscopic appendectomy      Social History:  Occupation:     Physical Exam     Patient Vitals for the past 24 hrs:   BP Pulse Resp SpO2 Height Weight   12/18/20 2349 115/72 77 16 98 % -- --   12/18/20 2251 105/73 76 16 99 % -- --   12/18/20 2147 134/68 99 16 100 % 1.676 m (5' 6\") 65.8 kg (145 lb)       Physical Exam  Constitutional: Young black female, supine. No respiratory distress.   HENT: No signs of trauma.   Eyes: EOM are normal. Pupils are equal, round, and reactive to light. "   Neck: Normal range of motion. No JVD present. No cervical adenopathy.  Cardiovascular: Regular rhythm.  Exam reveals no gallop and no friction rub.    No murmur heard.  Pulmonary/Chest: Bilateral breath sounds normal. No wheezes, rhonchi or rales.  Abdominal: Soft. No tenderness. No rebound or guarding. Healing laparoscopic incision without surrounding redness or edema. No obvious umbilical hernia. Mild diffuse tenderness, no guarding or rebound. 2+ femoral pulses.   Musculoskeletal: No edema. No tenderness.   Lymphadenopathy: No lymphadenopathy.   Neurological: Alert and oriented to person, place, and time. Normal strength. Coordination normal.   Skin: Skin is warm and dry. No rash noted. No erythema.     Emergency Department Course     Laboratory:  CBC: WBC: 5.7, HGB: 12.5, PLT: 385  CMP: Glucose 85, Creatinine: 0.78    UA: Protein Albumin: 30,  Bacteria: Few, Mucous: Present,       Emergency Department Course:    Reviewed:    I reviewed the patient's nursing notes, vitals, past medical records, Care Everywhere.     Assessments:  2245 I evaluated the patient.   2344 I rechecked the patient.     Interventions:  2250 Toradol 15 mg IV     Disposition:  The patient was discharged to home.       Impression & Plan     Medical Decision Making:  Hortensia Umaña is a 20 year old who comes in with abdominal pain. About 4 weeks ago she underwent a laparoscopic appendectomy by Dr. Preston. She said she had a a little pain for a little while after that but then it seemed to go away. She has gone back to work as a  and when she moves around or lifts things she seems to get more pain. Particularly, at the incision site around her umbilicus. She is not having any nausea or vomiting, or any bladder or bowel issues. She has noted a small amount of whitish discharge but has never been sexually active. Her periods have been normal. She also mentions that she has not had a lot of bowel movements lately. On exam she has mild  abdominal discomfort without guarding or rebound. There was no sign of herniation, infection of the laparoscopic scars. Her labs are unremarkably including urine. Patient received some Toradol here and this has helped her quite a bit. There is nothing to suggest surgical obstriction ,absccses or perforation. This may be simply some post-surgical scar pain and I recommended she use some Advil or aleve and warm compresses. She has a plan and scheduled to see Dr. Mackay next week. If symptoms change in the meantime return to the ED and use your stool softeners.     Diagnosis:    ICD-10-CM    1. Abdominal pain, generalized  R10.84          Scribe Disclosure:  I, Emma Mortensen, am serving as a scribe at 10:45 PM on 12/18/2020 to document services personally performed by Carlos Barron MD based on my observations and the provider's statements to me.              Carlos Barron MD  12/19/20 0106

## 2020-12-19 NOTE — ED TRIAGE NOTES
Pt reports having her appendix removed 11/21/20. Pt states that over the last 4 days she has been having increased abdominal pain over her belly button.

## 2020-12-19 NOTE — ED NOTES
Pt reports her pain is a 0/10, pt repots she feels well enough for discharge home, pt denies any needs.

## 2020-12-19 NOTE — ED NOTES
Pt reports she has had right sided abd pain for the last 4 days, pt denies any dysuria, abnormal bms, pt denies any fevers, pt has not had any nausea or vomiting, her only complaint is pain.  Pt is alert and oriented x4, speaking full clear sentences, respirations non-labored, skin warm dry and intact, strong pulses throughout, abd is soft and non-distended, pt ambulating with a steady gait, pt denies any needs at this time, call bell in reach.

## 2020-12-22 ENCOUNTER — OFFICE VISIT (OUTPATIENT)
Dept: SURGERY | Facility: CLINIC | Age: 20
End: 2020-12-22
Payer: OTHER GOVERNMENT

## 2020-12-22 DIAGNOSIS — Z09 SURGERY FOLLOW-UP EXAMINATION: Primary | ICD-10-CM

## 2020-12-22 PROCEDURE — 99024 POSTOP FOLLOW-UP VISIT: CPT | Performed by: SURGERY

## 2020-12-22 NOTE — PROGRESS NOTES
Surgery Postoperative Note    S: Hortensia is a 20yof who underwent laparoscopic appendectomy on 11/21/2020 for acute appendicitis. She was discharged from PACU. She has had intermittent pain on her abdomen since surgery. She was seen in the ER on 12/18/2020 due to this pain. She was given toradol and symptoms improved. She reports she is feeling better since her ER visit but still has some discomfort in the right midabdomen. She is having issues with constipation still. Senokot caused some crampy abdominal pain. She denies issues with nausea or emesis. No fevers.     Abdomen: incisions all healing well, no erythema or induration, mildly tender to palpation right mid abdomen     Pathology: FINAL DIAGNOSIS:   Appendix:   - Acute appendicitis without perforation or atypical cellular   proliferations,     A/P  Hortensia Umaña is recovering from a Laparoscopic Appendectomy. I advised her to slowly return to regular activity. I expect she will make a complete recovery without issues. We reviewed her pathology today as well. In regard to the right mid abdominal pain, I suspect this is related to constipation. I recommend she start miralax BID and add milk of magnesia if miralax not effective after a couple days. Increase hydration and increase fiber. She will let me know if pain not resolving with improved constipation.     Thank you for the opportunity to help in her care.    Laura Preston MD  [facility]  [phone]    Please route or send letter to:  Primary Care Provider (PCP) and Referring Provider

## 2021-01-09 ENCOUNTER — HEALTH MAINTENANCE LETTER (OUTPATIENT)
Age: 21
End: 2021-01-09

## 2021-05-01 ENCOUNTER — HEALTH MAINTENANCE LETTER (OUTPATIENT)
Age: 21
End: 2021-05-01

## 2021-07-06 ENCOUNTER — NURSE TRIAGE (OUTPATIENT)
Dept: NURSING | Facility: CLINIC | Age: 21
End: 2021-07-06

## 2021-07-06 ENCOUNTER — HOSPITAL ENCOUNTER (EMERGENCY)
Facility: CLINIC | Age: 21
Discharge: HOME | End: 2021-07-06
Attending: PHYSICIAN ASSISTANT | Admitting: PHYSICIAN ASSISTANT
Payer: COMMERCIAL

## 2021-07-06 VITALS
TEMPERATURE: 97.1 F | OXYGEN SATURATION: 100 % | HEIGHT: 66 IN | HEART RATE: 68 BPM | RESPIRATION RATE: 14 BRPM | SYSTOLIC BLOOD PRESSURE: 116 MMHG | WEIGHT: 140 LBS | DIASTOLIC BLOOD PRESSURE: 60 MMHG | BODY MASS INDEX: 22.5 KG/M2

## 2021-07-06 DIAGNOSIS — R11.10 VOMITING, INTRACTABILITY OF VOMITING NOT SPECIFIED, PRESENCE OF NAUSEA NOT SPECIFIED, UNSPECIFIED VOMITING TYPE: ICD-10-CM

## 2021-07-06 DIAGNOSIS — R11.10 VOMITING: ICD-10-CM

## 2021-07-06 LAB
ALBUMIN SERPL-MCNC: 4.2 G/DL (ref 3.4–5)
ALBUMIN UR-MCNC: NEGATIVE MG/DL
ALP SERPL-CCNC: 72 U/L (ref 40–150)
ALT SERPL W P-5'-P-CCNC: 15 U/L (ref 0–50)
ANION GAP SERPL CALCULATED.3IONS-SCNC: 3 MMOL/L (ref 3–14)
APPEARANCE UR: CLEAR
AST SERPL W P-5'-P-CCNC: 10 U/L (ref 0–45)
BACTERIA #/AREA URNS HPF: ABNORMAL /HPF
BASOPHILS # BLD AUTO: 0 10E9/L (ref 0–0.2)
BASOPHILS NFR BLD AUTO: 0.9 %
BILIRUB SERPL-MCNC: 0.5 MG/DL (ref 0.2–1.3)
BILIRUB UR QL STRIP: NEGATIVE
BUN SERPL-MCNC: 12 MG/DL (ref 7–30)
CALCIUM SERPL-MCNC: 9.2 MG/DL (ref 8.5–10.1)
CHLORIDE SERPL-SCNC: 110 MMOL/L (ref 94–109)
CO2 SERPL-SCNC: 27 MMOL/L (ref 20–32)
COLOR UR AUTO: ABNORMAL
CREAT SERPL-MCNC: 0.62 MG/DL (ref 0.52–1.04)
DIFFERENTIAL METHOD BLD: NORMAL
EOSINOPHIL # BLD AUTO: 0 10E9/L (ref 0–0.7)
EOSINOPHIL NFR BLD AUTO: 0 %
ERYTHROCYTE [DISTWIDTH] IN BLOOD BY AUTOMATED COUNT: 14.4 % (ref 10–15)
GFR SERPL CREATININE-BSD FRML MDRD: >90 ML/MIN/{1.73_M2}
GLUCOSE SERPL-MCNC: 81 MG/DL (ref 70–99)
GLUCOSE UR STRIP-MCNC: NEGATIVE MG/DL
HCG UR QL: NEGATIVE
HCT VFR BLD AUTO: 39.6 % (ref 35–47)
HGB BLD-MCNC: 13.1 G/DL (ref 11.7–15.7)
HGB UR QL STRIP: ABNORMAL
IMM GRANULOCYTES # BLD: 0 10E9/L (ref 0–0.4)
IMM GRANULOCYTES NFR BLD: 0.2 %
KETONES UR STRIP-MCNC: 40 MG/DL
LEUKOCYTE ESTERASE UR QL STRIP: NEGATIVE
LIPASE SERPL-CCNC: 129 U/L (ref 73–393)
LYMPHOCYTES # BLD AUTO: 1.4 10E9/L (ref 0.8–5.3)
LYMPHOCYTES NFR BLD AUTO: 29.9 %
MCH RBC QN AUTO: 30.2 PG (ref 26.5–33)
MCHC RBC AUTO-ENTMCNC: 33.1 G/DL (ref 31.5–36.5)
MCV RBC AUTO: 91 FL (ref 78–100)
MONOCYTES # BLD AUTO: 0.4 10E9/L (ref 0–1.3)
MONOCYTES NFR BLD AUTO: 8.9 %
MUCOUS THREADS #/AREA URNS LPF: PRESENT /LPF
NEUTROPHILS # BLD AUTO: 2.7 10E9/L (ref 1.6–8.3)
NEUTROPHILS NFR BLD AUTO: 60.1 %
NITRATE UR QL: NEGATIVE
NRBC # BLD AUTO: 0 10*3/UL
NRBC BLD AUTO-RTO: 0 /100
PH UR STRIP: 5.5 PH (ref 5–7)
PLATELET # BLD AUTO: 312 10E9/L (ref 150–450)
POTASSIUM SERPL-SCNC: 3.5 MMOL/L (ref 3.4–5.3)
PROT SERPL-MCNC: 8 G/DL (ref 6.8–8.8)
RBC # BLD AUTO: 4.34 10E12/L (ref 3.8–5.2)
RBC #/AREA URNS AUTO: 1 /HPF (ref 0–2)
SODIUM SERPL-SCNC: 140 MMOL/L (ref 133–144)
SOURCE: ABNORMAL
SP GR UR STRIP: 1.01 (ref 1–1.03)
SQUAMOUS #/AREA URNS AUTO: 3 /HPF (ref 0–1)
UROBILINOGEN UR STRIP-MCNC: 0 MG/DL (ref 0–2)
WBC # BLD AUTO: 4.5 10E9/L (ref 4–11)
WBC #/AREA URNS AUTO: 2 /HPF (ref 0–5)

## 2021-07-06 PROCEDURE — 96374 THER/PROPH/DIAG INJ IV PUSH: CPT

## 2021-07-06 PROCEDURE — 81025 URINE PREGNANCY TEST: CPT | Performed by: EMERGENCY MEDICINE

## 2021-07-06 PROCEDURE — 250N000011 HC RX IP 250 OP 636: Performed by: PHYSICIAN ASSISTANT

## 2021-07-06 PROCEDURE — 81001 URINALYSIS AUTO W/SCOPE: CPT | Performed by: PHYSICIAN ASSISTANT

## 2021-07-06 PROCEDURE — 258N000003 HC RX IP 258 OP 636: Performed by: PHYSICIAN ASSISTANT

## 2021-07-06 PROCEDURE — 250N000009 HC RX 250: Performed by: PHYSICIAN ASSISTANT

## 2021-07-06 PROCEDURE — 83690 ASSAY OF LIPASE: CPT | Performed by: PHYSICIAN ASSISTANT

## 2021-07-06 PROCEDURE — 99284 EMERGENCY DEPT VISIT MOD MDM: CPT | Mod: 25

## 2021-07-06 PROCEDURE — 96375 TX/PRO/DX INJ NEW DRUG ADDON: CPT

## 2021-07-06 PROCEDURE — 85025 COMPLETE CBC W/AUTO DIFF WBC: CPT | Performed by: PHYSICIAN ASSISTANT

## 2021-07-06 PROCEDURE — 96361 HYDRATE IV INFUSION ADD-ON: CPT

## 2021-07-06 PROCEDURE — 80053 COMPREHEN METABOLIC PANEL: CPT | Performed by: PHYSICIAN ASSISTANT

## 2021-07-06 RX ORDER — FAMOTIDINE 20 MG/1
20 TABLET, FILM COATED ORAL 2 TIMES DAILY
Qty: 10 TABLET | Refills: 0 | Status: SHIPPED | OUTPATIENT
Start: 2021-07-06 | End: 2021-07-11

## 2021-07-06 RX ORDER — ONDANSETRON 2 MG/ML
4 INJECTION INTRAMUSCULAR; INTRAVENOUS ONCE
Status: COMPLETED | OUTPATIENT
Start: 2021-07-06 | End: 2021-07-06

## 2021-07-06 RX ORDER — PROCHLORPERAZINE MALEATE 5 MG
5 TABLET ORAL EVERY 8 HOURS PRN
Qty: 20 TABLET | Refills: 0 | Status: SHIPPED | OUTPATIENT
Start: 2021-07-06 | End: 2022-09-27

## 2021-07-06 RX ADMIN — ONDANSETRON 4 MG: 2 INJECTION INTRAMUSCULAR; INTRAVENOUS at 17:13

## 2021-07-06 RX ADMIN — FAMOTIDINE 20 MG: 10 INJECTION, SOLUTION INTRAVENOUS at 17:13

## 2021-07-06 RX ADMIN — SODIUM CHLORIDE 1000 ML: 9 INJECTION, SOLUTION INTRAVENOUS at 17:13

## 2021-07-06 ASSESSMENT — ENCOUNTER SYMPTOMS
BLOOD IN STOOL: 0
CHILLS: 1
FEVER: 0
MYALGIAS: 1
CONSTIPATION: 1
NAUSEA: 1
HEADACHES: 1
VOMITING: 1
DYSURIA: 0

## 2021-07-06 ASSESSMENT — MIFFLIN-ST. JEOR: SCORE: 1416.79

## 2021-07-06 NOTE — ED PROVIDER NOTES
"  History   Chief Complaint:  Nausea & Vomiting       The history is provided by the patient.      Hortensia Umaña is a 21 year old female who presents with nausea and vomiting. The patient reports that she has been vomiting for 2 weeks and has not been able to eat anything for the past 3 days. She states that she also has body aches, chills, a mild  headache, and has been tired recently. The patient has had less bowel movements than normal. She also reports that she has had similar symptoms in the past but they did not last this long. The patient denies any fever, dysuria, alcohol or drug use, black or bloody stools, and any history of abdominal surgeries besides an appendix removal.    She notes that she is scheduled to move to [place] to be near family but has had some stress surrounding this.    Review of Systems   Constitutional: Positive for chills. Negative for fever.   Gastrointestinal: Positive for constipation, nausea and vomiting. Negative for blood in stool.   Genitourinary: Negative for dysuria.   Musculoskeletal: Positive for myalgias.   Neurological: Positive for headaches.   All other systems reviewed and are negative.      Allergies:  Hydrocodone  Peanuts   Seasonal Allergies    Medications:  Cetirizine     Past Medical History:    Asthma     Past Surgical History:    Laparoscopic Appendectomy     Family History:    Allergic Rhinitis, Brother  Hypertension, Mother    Social History:  Smoking status: No  Alcohol use: No  Drug use: No  PCP: Physician No Ref-Primary  Presents to the ED alone      Physical Exam     Patient Vitals for the past 24 hrs:   BP Temp Temp src Pulse Resp SpO2 Height Weight   07/06/21 1610 116/60 97.1  F (36.2  C) Temporal 68 14 100 % 1.676 m (5' 6\") 63.5 kg (140 lb)       Physical Exam  General: Awake, alert, pleasant, non-toxic.  Head:  Scalp is NC/AT  Eyes:  Conjunctiva normal, PERRL  ENT:  The external nose and ears are normal.   Neck:  Normal range of motion without " rigidity.  CV:  Regular rate and rhythm    No pathologic murmur, rubs, or gallops.  Resp:  Breath sounds are clear bilaterally.  No crackles, wheezes, rhonchi, stridor.    Non-labored, no retractions or accessory muscle use  Abdomen: Abdomen is soft, no distension, mild epigastric tenderness, no masses. No CVA tenderness.  MS:  No lower extremity edema or asymmetric calf swelling. Normal ROM in all joints without effusions.    No midline cervical, thoracic, or lumbar tenderness  Skin:  Warm and dry, No rash or lesions noted. 2+ peripheral pulses in all extremities  Neuro: Alert and oriented x3.  5/5 strength BL in UE and LE, normal sensation to touch.  Cranial nerves 2-12 intact.  Normal finger to nose testing.  Gait normal  Psych: Awake. Alert. Normal affect. Appropriate interactions.      Emergency Department Course     Laboratory:  CBC: WBC 4.5, HGB 13.1,    CMP: Glucose 81, Chloride 110 (H), o/w WNL (Creatinine: 0.62)    Lipase: 129  UA: Ketones: 40, Blood: Moderate, Bacteria: Few, Squamous Epithelial: 3 (H), Mucous: Present, o/w Negative  HCG Qualitative Urine: Negative    Emergency Department Course:  Reviewed:  I reviewed the patient's nursing notes, vitals, past medical records, Care Everywhere.     Assessments:  1654 I performed an assessment and examination of the patient as noted above.      1924 I updated the patient and she reports that she feels a little better. Findings and plan explained to the Patient. Patient discharged home with instructions regarding supportive care, medications, and reasons to return. The importance of close follow-up was reviewed. The patient was prescribed Pepcid, Prilosec, and Compazine.        Interventions:  1713 NS 1L IV Bolus  1713 Zofran 4mg IV injection   1713 Pepcid 20 mg IV    Disposition:  The patient was discharged to home.       Impression & Plan   Medical Decision Making:  Hortensia Umaña is a 21 year old female who presents with vomiting. She has a benign  abdominal exam without focal RLQ or LLQ tenderness to suggest diverticulitis or appendicitis, respectively, or other acute abdominal process. I did discuss the sometimes vague initial constellation of symptoms early in the course of acute abdominal processes, and the need for immediate return should pain or other concerning symptoms develop.  Symptoms are improved after IV fluids and symptomatic treatment.  The patient is not pregnant. There is no evidence of urinary tract infection. there has been no hematemesis or BRBPR/melena.  She has had a mild headache but no evidence of increased ICP and no signs of more concerning causes such as subarachnoid hemorrhage, stroke, cervical artery dissection, CVT, mass etc.  Vital signs have remained stable throughout the ED course, and the abdominal re-exam remains benign. No symptoms to suggest referred pain from chest or atypical ACS.  I believe patient is safe for discharge in improved condition at this time with strict return precautions for recurrent vomiting, pain, fever or any other concerning symptoms.    Covid-19  Hortensia Umaña was evaluated during a global COVID-19 pandemic, which necessitated consideration that the patient might be at risk for infection with the SARS-CoV-2 virus that causes COVID-19.   Applicable protocols for evaluation were followed during the patient's care.     Diagnosis:    ICD-10-CM    1. Vomiting  R11.10        Discharge Medications:  New Prescriptions    FAMOTIDINE (PEPCID) 20 MG TABLET    Take 1 tablet (20 mg) by mouth 2 times daily for 5 days    OMEPRAZOLE (PRILOSEC) 20 MG DR CAPSULE    Take 1 capsule (20 mg) by mouth daily for 14 days    PROCHLORPERAZINE (COMPAZINE) 5 MG TABLET    Take 1 tablet (5 mg) by mouth every 8 hours as needed for nausea or vomiting       Scribe Disclosure:  BLAIR, JESSICA DOS SANTOS, am serving as a scribe at 4:54 PM on 7/6/2021 to document services personally performed by Sean Morgan, * based on my observations  and the provider's statements to me.        Sean Morgan PA-C  07/06/21 1943

## 2021-07-06 NOTE — TELEPHONE ENCOUNTER
Patient calling, and states she has not been able to keep any food down for 2 weeks. She states when she eats, she vomits. She reports 'no insurance'. And states she has an MD at [facility], but her MD will be out for 2 more weeks.  She is calling to [facility] here today. Patient states she is concerned because she  Is losing weight from being unable to eat for 2 weeks.    Patient advised to go to the ER @ [facility]   Now.  Patient agrees to go to ER.    Marija Delgado, RN RN  [facility] Triage/refill nurse    Reason for Disposition    Patient sounds very sick or weak to the triager    Protocols used: VOMITING-A-OH

## 2021-07-12 ENCOUNTER — APPOINTMENT (OUTPATIENT)
Dept: CT IMAGING | Facility: CLINIC | Age: 21
End: 2021-07-12
Attending: EMERGENCY MEDICINE
Payer: COMMERCIAL

## 2021-07-12 ENCOUNTER — HOSPITAL ENCOUNTER (EMERGENCY)
Facility: CLINIC | Age: 21
Discharge: HOME | End: 2021-07-12
Attending: EMERGENCY MEDICINE | Admitting: EMERGENCY MEDICINE
Payer: COMMERCIAL

## 2021-07-12 VITALS
DIASTOLIC BLOOD PRESSURE: 62 MMHG | SYSTOLIC BLOOD PRESSURE: 101 MMHG | HEIGHT: 66 IN | WEIGHT: 135 LBS | TEMPERATURE: 98.6 F | HEART RATE: 93 BPM | BODY MASS INDEX: 21.69 KG/M2 | RESPIRATION RATE: 16 BRPM | OXYGEN SATURATION: 96 %

## 2021-07-12 DIAGNOSIS — E87.6 HYPOKALEMIA: ICD-10-CM

## 2021-07-12 DIAGNOSIS — R10.84 ABDOMINAL PAIN, GENERALIZED: ICD-10-CM

## 2021-07-12 DIAGNOSIS — A04.8 H. PYLORI INFECTION: ICD-10-CM

## 2021-07-12 DIAGNOSIS — K29.00 ACUTE GASTRITIS WITHOUT HEMORRHAGE, UNSPECIFIED GASTRITIS TYPE: ICD-10-CM

## 2021-07-12 DIAGNOSIS — Z87.19 HISTORY OF MELENA: ICD-10-CM

## 2021-07-12 LAB
ALBUMIN SERPL-MCNC: 3.9 G/DL (ref 3.4–5)
ALP SERPL-CCNC: 77 U/L (ref 40–150)
ALT SERPL W P-5'-P-CCNC: 16 U/L (ref 0–50)
ANION GAP SERPL CALCULATED.3IONS-SCNC: 5 MMOL/L (ref 3–14)
AST SERPL W P-5'-P-CCNC: 9 U/L (ref 0–45)
BASOPHILS # BLD AUTO: 0 10E3/UL (ref 0–0.2)
BASOPHILS NFR BLD AUTO: 0 %
BILIRUB SERPL-MCNC: 0.4 MG/DL (ref 0.2–1.3)
BUN SERPL-MCNC: 3 MG/DL (ref 7–30)
CALCIUM SERPL-MCNC: 9.2 MG/DL (ref 8.5–10.1)
CHLORIDE BLD-SCNC: 108 MMOL/L (ref 94–109)
CO2 SERPL-SCNC: 26 MMOL/L (ref 20–32)
CREAT SERPL-MCNC: 0.77 MG/DL (ref 0.52–1.04)
EOSINOPHIL # BLD AUTO: 0.1 10E3/UL (ref 0–0.7)
EOSINOPHIL NFR BLD AUTO: 1 %
ERYTHROCYTE [DISTWIDTH] IN BLOOD BY AUTOMATED COUNT: 14.1 % (ref 10–15)
GFR SERPL CREATININE-BSD FRML MDRD: >90 ML/MIN/1.73M2
GLUCOSE BLD-MCNC: 90 MG/DL (ref 70–99)
HCT VFR BLD AUTO: 40.7 % (ref 35–47)
HEMOCCULT STL QL: NEGATIVE
HGB BLD-MCNC: 13.4 G/DL (ref 11.7–15.7)
IMM GRANULOCYTES # BLD: 0 10E3/UL
IMM GRANULOCYTES NFR BLD: 0 %
LIPASE SERPL-CCNC: 315 U/L (ref 73–393)
LYMPHOCYTES # BLD AUTO: 0.8 10E3/UL (ref 0.8–5.3)
LYMPHOCYTES NFR BLD AUTO: 13 %
MCH RBC QN AUTO: 29.8 PG (ref 26.5–33)
MCHC RBC AUTO-ENTMCNC: 32.9 G/DL (ref 31.5–36.5)
MCV RBC AUTO: 91 FL (ref 78–100)
MONOCYTES # BLD AUTO: 0.4 10E3/UL (ref 0–1.3)
MONOCYTES NFR BLD AUTO: 6 %
NEUTROPHILS # BLD AUTO: 4.9 10E3/UL (ref 1.6–8.3)
NEUTROPHILS NFR BLD AUTO: 80 %
NRBC # BLD AUTO: 0 10E3/UL
NRBC BLD AUTO-RTO: 0 /100
PLATELET # BLD AUTO: 306 10E3/UL (ref 150–450)
POTASSIUM BLD-SCNC: 3.1 MMOL/L (ref 3.4–5.3)
PROT SERPL-MCNC: 7.9 G/DL (ref 6.8–8.8)
RBC # BLD AUTO: 4.49 10E6/UL (ref 3.8–5.2)
SODIUM SERPL-SCNC: 139 MMOL/L (ref 133–144)
WBC # BLD AUTO: 6.2 10E3/UL (ref 4–11)

## 2021-07-12 PROCEDURE — 250N000011 HC RX IP 250 OP 636: Performed by: EMERGENCY MEDICINE

## 2021-07-12 PROCEDURE — 85004 AUTOMATED DIFF WBC COUNT: CPT | Performed by: EMERGENCY MEDICINE

## 2021-07-12 PROCEDURE — 36415 COLL VENOUS BLD VENIPUNCTURE: CPT | Performed by: EMERGENCY MEDICINE

## 2021-07-12 PROCEDURE — 83690 ASSAY OF LIPASE: CPT | Performed by: EMERGENCY MEDICINE

## 2021-07-12 PROCEDURE — 250N000009 HC RX 250: Performed by: EMERGENCY MEDICINE

## 2021-07-12 PROCEDURE — 250N000013 HC RX MED GY IP 250 OP 250 PS 637: Performed by: EMERGENCY MEDICINE

## 2021-07-12 PROCEDURE — 258N000003 HC RX IP 258 OP 636: Performed by: EMERGENCY MEDICINE

## 2021-07-12 PROCEDURE — 96375 TX/PRO/DX INJ NEW DRUG ADDON: CPT

## 2021-07-12 PROCEDURE — 82040 ASSAY OF SERUM ALBUMIN: CPT | Performed by: EMERGENCY MEDICINE

## 2021-07-12 PROCEDURE — 74177 CT ABD & PELVIS W/CONTRAST: CPT

## 2021-07-12 PROCEDURE — 99285 EMERGENCY DEPT VISIT HI MDM: CPT | Mod: 25

## 2021-07-12 PROCEDURE — 36592 COLLECT BLOOD FROM PICC: CPT | Performed by: EMERGENCY MEDICINE

## 2021-07-12 PROCEDURE — 96374 THER/PROPH/DIAG INJ IV PUSH: CPT | Mod: 59

## 2021-07-12 PROCEDURE — 82272 OCCULT BLD FECES 1-3 TESTS: CPT | Performed by: EMERGENCY MEDICINE

## 2021-07-12 PROCEDURE — 96361 HYDRATE IV INFUSION ADD-ON: CPT

## 2021-07-12 PROCEDURE — 84702 CHORIONIC GONADOTROPIN TEST: CPT | Performed by: EMERGENCY MEDICINE

## 2021-07-12 RX ORDER — ONDANSETRON 4 MG/1
4 TABLET, ORALLY DISINTEGRATING ORAL EVERY 6 HOURS PRN
Qty: 12 TABLET | Refills: 0 | Status: SHIPPED | OUTPATIENT
Start: 2021-07-12 | End: 2022-09-27

## 2021-07-12 RX ORDER — POTASSIUM CHLORIDE 1500 MG/1
20 TABLET, EXTENDED RELEASE ORAL 2 TIMES DAILY
Qty: 14 TABLET | Refills: 0 | Status: SHIPPED | OUTPATIENT
Start: 2021-07-12 | End: 2021-07-19

## 2021-07-12 RX ORDER — IOPAMIDOL 755 MG/ML
68 INJECTION, SOLUTION INTRAVASCULAR ONCE
Status: COMPLETED | OUTPATIENT
Start: 2021-07-12 | End: 2021-07-12

## 2021-07-12 RX ORDER — METOCLOPRAMIDE 10 MG/1
10 TABLET ORAL 3 TIMES DAILY PRN
Qty: 20 TABLET | Refills: 0 | Status: SHIPPED | OUTPATIENT
Start: 2021-07-12 | End: 2022-09-27

## 2021-07-12 RX ORDER — SUCRALFATE ORAL 1 G/10ML
1 SUSPENSION ORAL 4 TIMES DAILY
Qty: 420 ML | Refills: 0 | Status: SHIPPED | OUTPATIENT
Start: 2021-07-12 | End: 2022-09-27

## 2021-07-12 RX ORDER — MORPHINE SULFATE 4 MG/ML
4 INJECTION, SOLUTION INTRAMUSCULAR; INTRAVENOUS
Status: DISCONTINUED | OUTPATIENT
Start: 2021-07-12 | End: 2021-07-12 | Stop reason: HOSPADM

## 2021-07-12 RX ORDER — POTASSIUM CHLORIDE 1500 MG/1
40 TABLET, EXTENDED RELEASE ORAL ONCE
Status: COMPLETED | OUTPATIENT
Start: 2021-07-12 | End: 2021-07-12

## 2021-07-12 RX ORDER — ONDANSETRON 2 MG/ML
4 INJECTION INTRAMUSCULAR; INTRAVENOUS EVERY 30 MIN PRN
Status: DISCONTINUED | OUTPATIENT
Start: 2021-07-12 | End: 2021-07-12 | Stop reason: HOSPADM

## 2021-07-12 RX ORDER — SUCRALFATE ORAL 1 G/10ML
1 SUSPENSION ORAL ONCE
Status: COMPLETED | OUTPATIENT
Start: 2021-07-12 | End: 2021-07-12

## 2021-07-12 RX ORDER — DIPHENHYDRAMINE HYDROCHLORIDE 50 MG/ML
25 INJECTION INTRAMUSCULAR; INTRAVENOUS ONCE
Status: COMPLETED | OUTPATIENT
Start: 2021-07-12 | End: 2021-07-12

## 2021-07-12 RX ORDER — ONDANSETRON 2 MG/ML
4 INJECTION INTRAMUSCULAR; INTRAVENOUS ONCE
Status: COMPLETED | OUTPATIENT
Start: 2021-07-12 | End: 2021-07-12

## 2021-07-12 RX ORDER — HALOPERIDOL 5 MG/ML
2 INJECTION INTRAMUSCULAR ONCE
Status: COMPLETED | OUTPATIENT
Start: 2021-07-12 | End: 2021-07-12

## 2021-07-12 RX ORDER — SODIUM CHLORIDE 9 MG/ML
INJECTION, SOLUTION INTRAVENOUS CONTINUOUS
Status: DISCONTINUED | OUTPATIENT
Start: 2021-07-12 | End: 2021-07-12 | Stop reason: HOSPADM

## 2021-07-12 RX ADMIN — DIPHENHYDRAMINE HYDROCHLORIDE 25 MG: 50 INJECTION INTRAMUSCULAR; INTRAVENOUS at 16:08

## 2021-07-12 RX ADMIN — POTASSIUM CHLORIDE 40 MEQ: 1500 TABLET, EXTENDED RELEASE ORAL at 16:29

## 2021-07-12 RX ADMIN — SODIUM CHLORIDE 60 ML: 9 INJECTION, SOLUTION INTRAVENOUS at 14:12

## 2021-07-12 RX ADMIN — IOPAMIDOL 68 ML: 755 INJECTION, SOLUTION INTRAVENOUS at 14:12

## 2021-07-12 RX ADMIN — HALOPERIDOL LACTATE 2 MG: 5 INJECTION, SOLUTION INTRAMUSCULAR at 16:08

## 2021-07-12 RX ADMIN — ONDANSETRON 4 MG: 2 INJECTION INTRAMUSCULAR; INTRAVENOUS at 12:48

## 2021-07-12 RX ADMIN — SODIUM CHLORIDE 1000 ML: 9 INJECTION, SOLUTION INTRAVENOUS at 13:44

## 2021-07-12 RX ADMIN — MORPHINE SULFATE 4 MG: 4 INJECTION, SOLUTION INTRAMUSCULAR; INTRAVENOUS at 15:47

## 2021-07-12 RX ADMIN — SUCRALFATE ORAL 1 G: 1 SUSPENSION ORAL at 17:10

## 2021-07-12 RX ADMIN — LIDOCAINE HYDROCHLORIDE 30 ML: 20 SOLUTION ORAL; TOPICAL at 12:24

## 2021-07-12 ASSESSMENT — ENCOUNTER SYMPTOMS
FEVER: 0
COUGH: 0
ROS GI COMMENTS: BLACK STOOL
VOMITING: 1
UNEXPECTED WEIGHT CHANGE: 1
NAUSEA: 1
DYSURIA: 0
BACK PAIN: 1
ABDOMINAL PAIN: 1
FATIGUE: 1
LIGHT-HEADEDNESS: 1
APPETITE CHANGE: 1
DIARRHEA: 1
FREQUENCY: 0

## 2021-07-12 ASSESSMENT — MIFFLIN-ST. JEOR: SCORE: 1394.11

## 2021-07-12 NOTE — ED TRIAGE NOTES
Pt recently diagnosed with H. Pylori and started on 2 abx. Pt having abdominal and back pain now. Body aches, n/v.

## 2021-07-12 NOTE — ED PROVIDER NOTES
History     Chief Complaint:  Abdominal Pain     HPI   Hortensia Umaña is a 21 year old female with history of asthma who presents for evaluation of abdominal pain. The patient reports that for the past few weeks she has been experiencing abdominal pain with a decreased appetite, nausea, and vomiting anytime she tries to eat or drink. She has been fatigued and lightheaded due to her symptoms and has had a 20 lb weight loss. 3 days ago, she sent a stool sample in which came back positive for H. Pylori. However, since then, she has had worsening pain to the abdomen, rating it a 8/10, and it radiates straight back. Yesterday, she started to develop diarrhea which she did not have prior and her stool looks black. Her last bowel movement was 3 hours ago and she last vomited yesterday. She denies vaginal bleeding or discharge, fever, cough, dysuria, or frequency. No alleviating factors. The patient uses marijuana occasional, last used 3 days ago. Her last menstrual was July 3.           Review of Systems   Constitutional: Positive for appetite change, fatigue and unexpected weight change. Negative for fever.   Respiratory: Negative for cough.    Gastrointestinal: Positive for abdominal pain, diarrhea, nausea and vomiting.        Black stool   Genitourinary: Negative for dysuria, frequency, vaginal bleeding and vaginal discharge.   Musculoskeletal: Positive for back pain (radiating from abdomen).   Neurological: Positive for light-headedness.   All other systems reviewed and are negative.    Allergies:  Hydrocodone  Peanuts   Seasonal Allergies    Medications:  Omeprazole   prochlorperazine     Past Medical History:    Asthma  Pulmonary nodule     Past Surgical History:    Appendectomy    Family History:    Brother: allergies   Maternal grandmother: lung cancer  Mother: hypertension    Social History:  The patient was unaccompanied to the ED.  marijuana use  No alcohol, tobacco.     Physical Exam     Patient Vitals for  "the past 24 hrs:   BP Temp Temp src Pulse Resp SpO2 Height Weight   07/12/21 1708 101/62 -- -- 93 -- 96 % -- --   07/12/21 1545 -- -- -- -- -- 98 % -- --   07/12/21 1530 -- -- -- -- -- 100 % -- --   07/12/21 1515 -- -- -- -- -- 99 % -- --   07/12/21 1500 -- -- -- -- -- 99 % -- --   07/12/21 1445 -- -- -- -- -- 100 % -- --   07/12/21 1330 -- -- -- -- -- 99 % -- --   07/12/21 1315 -- -- -- -- -- 99 % -- --   07/12/21 1245 -- -- -- -- -- 100 % -- --   07/12/21 1209 96/68 98.6  F (37  C) Temporal 91 16 98 % 1.676 m (5' 6\") 61.2 kg (135 lb)     Physical Exam    Constitutional: Well developed, nontox appearance  Head: Atraumatic.   Neck:  no stridor  Eyes: no scleral icterus  Cardiovascular: RRR, 2+ bilat radial pulses  Pulmonary/Chest: nml resp effort  Abdominal: ND, soft, somewhat diffuse tenderness worst inn epigastric and periumbilical areas, no rebound or guarding   : no CVA tenderness bilat  Ext: Warm, well perfused, no edema  Neurological: A&O, symmetric facies, moves ext x4  Skin: Skin is warm and dry.   Psychiatric: Behavior is normal. Thought content normal.   Nursing note and vitals reviewed.    Emergency Department Course     Imaging:    CT Abdomen Pelvis w Contrast  Appendectomy. No specific finding to explain the patient's abdominal  pain.  EDILSON LUCIO MD   Reading per radiology     Laboratory:    Occult Blood Stool: negative     CBC: WBC 6.2, HGB 13.4,   CMP: Potassium 3.1 (L), Urea Nitrogen 3 (L), o/w WNL (Creatinine 0.77)    Lipase: 315    Emergency Department Course:    Reviewed:    I reviewed the patient's nursing notes, vitals, past medical records, Care Everywhere.     Assessments:    1325 I performed an exam of the patient as documented above.     1542 Patient rechecked and updated.      Interventions:  1224 GI Cocktail 30 mL PO  1248 Zofran 4 mg IV  1344 NS 1000 mL IV   1608 Haldol 2 mg IV  1608 Benadryl 25 mg IV   1629 Potassium chloride 40 mEq PO    Disposition:  The patient was " discharged to home.     Impression & Plan      CMS Diagnoses: none    Medical Decision Makin year old female presenting w/ abdominal pain    Differential diagnosis includes gastritis, peptic ulcer disease, pancreatitis, hepatitis, hollow viscus perforation, biliary pathology although less likely given absent Suarez's sign and no severe tenderness over right upper quadrant.  Given patient's severe pain, CT ordered to evaluate for possible perforation which returned unremarkable.  Labs demonstrated hypokalemia otherwise no anemia, no stool occult blood.  Digital rectal exam done later in the emergency department stay was negative for melena.  Patient was given medications as noted above with improvement in symptoms.  She is likely experiencing gastritis versus gastric or duodenal ulcer.  I think she would benefit from gastroenterology follow-up with plan to continue current medications.  Supplemental medications were prescribed as noted below.  GI referral was placed prior to discharge.  She was instructed to not use marijuana.  At this time I feel the pt is safe for discharge.  Recommendations given regarding follow up with PCP, GI and return to the emergency department as needed for new or worsening symptoms.  Pt counseled on all results, disposition and diagnosis.  They are understanding and agreeable to plan. Patient discharged in stable condition.      Diagnosis:      ICD-10-CM    1. Abdominal pain, generalized  R10.84 Adult Gastro Ref - Procedure Only     GASTROENTEROLOGY ADULT REF CONSULT ONLY   2. Acute gastritis without hemorrhage, unspecified gastritis type  K29.00 Adult Gastro Ref - Procedure Only     GASTROENTEROLOGY ADULT REF CONSULT ONLY   3. H. pylori infection  A04.8 Adult Gastro Ref - Procedure Only     GASTROENTEROLOGY ADULT REF CONSULT ONLY   4. History of melena  Z87.19 Adult Gastro Ref - Procedure Only     GASTROENTEROLOGY ADULT REF CONSULT ONLY   5. Hypokalemia  E87.6        Discharge  Medications:  Discharge Medication List as of 7/12/2021  5:17 PM      START taking these medications    Details   COMPOUNDED NON-CONTROLLED SUBSTANCE (CMPD RX) - PHARMACY TO MIX COMPOUNDED MEDICATION Please compound viscous lidocaine 2% and maalox in a 1:1 ratio    Please take 15 to 30 ml by mouth every 4-6 hours as needed for abdominal pain, Disp-500 mL, R-0, Local Print      metoclopramide (REGLAN) 10 MG tablet Take 1 tablet (10 mg) by mouth 3 times daily as needed (Nausea or Vomiting), Disp-20 tablet, R-0, Local Print      ondansetron (ZOFRAN ODT) 4 MG ODT tab Take 1 tablet (4 mg) by mouth every 6 hours as needed for nausea or vomiting, Disp-12 tablet, R-0, Local Print      potassium chloride ER (K-TAB) 20 MEQ CR tablet Take 1 tablet (20 mEq) by mouth 2 times daily for 7 days, Disp-14 tablet, R-0, Local Print      sucralfate (CARAFATE) 1 GM/10ML suspension Take 10 mLs (1 g) by mouth 4 times daily, Disp-420 mL, R-0, Local Print           Scribe Disclosure:  I, Orla Severson, am serving as a scribe at 12:14 PM on 7/12/2021 to document services personally performed by John Tolliver MD based on my observations and the provider's statements to me.     [facility] EMERGENCY DEPT         John Tolliver MD  07/13/21 1116

## 2021-07-20 LAB — B-HCG FREE SERPL-ACNC: <5 IU/L (ref 0–5)

## 2021-09-16 PROCEDURE — 88342 IMHCHEM/IMCYTCHM 1ST ANTB: CPT | Mod: TC,ORL | Performed by: INTERNAL MEDICINE

## 2021-09-16 PROCEDURE — 88342 IMHCHEM/IMCYTCHM 1ST ANTB: CPT | Performed by: PATHOLOGY

## 2021-09-17 ENCOUNTER — LAB REQUISITION (OUTPATIENT)
Dept: LAB | Facility: CLINIC | Age: 21
End: 2021-09-17
Payer: COMMERCIAL

## 2021-09-21 LAB
PATH REPORT.ADDENDUM SPEC: NORMAL
PATH REPORT.COMMENTS IMP SPEC: NORMAL
PATH REPORT.COMMENTS IMP SPEC: NORMAL
PATH REPORT.FINAL DX SPEC: NORMAL
PATH REPORT.GROSS SPEC: NORMAL
PATH REPORT.MICROSCOPIC SPEC OTHER STN: NORMAL
PATH REPORT.RELEVANT HX SPEC: NORMAL
PHOTO IMAGE: NORMAL

## 2021-09-21 PROCEDURE — 88305 TISSUE EXAM BY PATHOLOGIST: CPT | Mod: 26 | Performed by: PATHOLOGY

## 2021-09-21 PROCEDURE — 88342 IMHCHEM/IMCYTCHM 1ST ANTB: CPT | Mod: 26 | Performed by: PATHOLOGY

## 2021-10-11 ENCOUNTER — HEALTH MAINTENANCE LETTER (OUTPATIENT)
Age: 21
End: 2021-10-11

## 2022-01-06 ENCOUNTER — E-VISIT (OUTPATIENT)
Dept: URGENT CARE | Facility: CLINIC | Age: 22
End: 2022-01-06
Payer: COMMERCIAL

## 2022-01-06 DIAGNOSIS — Z20.822 SUSPECTED COVID-19 VIRUS INFECTION: Primary | ICD-10-CM

## 2022-01-06 PROBLEM — Z91.09 ENVIRONMENTAL ALLERGIES: Status: ACTIVE | Noted: 2020-03-26

## 2022-01-06 PROBLEM — R91.1 PULMONARY NODULE: Status: ACTIVE | Noted: 2021-01-11

## 2022-01-06 PROCEDURE — 99421 OL DIG E/M SVC 5-10 MIN: CPT | Performed by: NURSE PRACTITIONER

## 2022-01-06 NOTE — PATIENT INSTRUCTIONS
Hortensia,      Based on your responses, you may have coronavirus (COVID-19). This illness can cause fever, cough and trouble breathing. Many people get a mild case and get better on their own. Some people can get very sick.    Will I be tested for COVID-19?  We would like to test you for COVID-19 virus. I have placed orders for this test.     To schedule: go to your [employer] home page and scroll down to the section that says  You have an appointment that needs to be scheduled  and click the large green button that says  Schedule Now  and follow the steps to find the next available openings.    If you are unable to complete these [employer] scheduling steps, please call [phone] to schedule your testing.     Return to work/school/ guidance:  Please let your workplace manager and staffing office know when your isolation ends.       If you receive a positive COVID-19 test result, follow the guidance of the those who are giving you the results. Usually the return to work is 10 days from symptom onset or positive test date, (or in some cases 20 days if you are immunocompromised). If your symptoms started after your positive test, the 10 days should start when your symptoms started.   o If you work at [employer], you must also be cleared by Employee Occupational Health and Safety to return to work.      If you receive a negative COVID-19 test result and did not have a high risk exposure to someone with a known positive COVID-19 test, you can return to work once you're free of fever for 24 hours without fever-reducing medication and your symptoms are improving or resolved.    If you receive a negative COVID-19 test and had a high-risk exposure to someone who has tested positive for COVID-19 then you can return to work 14 days after your last contact with the positive individual. Follow quarantine guidance given by your doctor or public health officials.     Sign up for Get[employer] Loop:  We know it's scary to hear  that you might have COVID-19. We want to track your symptoms to make sure you're okay over the next 2 weeks. Please look for an email from [employer]--this is a free, online program that we'll use to keep in touch. To sign up, follow the link in the email you will receive. Learn more at [email or web address]    How can I take care of myself?  Over the counter medications may help with your symptoms like congestion, cough, chills, or fever.    There are not many effective prescription treatments for early COVID-19. Hydroxychloroquine, ivermectin, and azithromycin are not effective or recommended for COVID-19.    If your symptoms started in the last 10 days, you may be able to receive a treatment with monoclonal antibodies. This treatment can lower your risk of severe illness and going to the hospital. It is given through an IV or under your skin (subcutaneous) and must be given at an infusion center. You must be 12 or older, weight at least 88 pounds, and have a positive COVID-19 test.     If you would like to sign up to be considered to receive the monoclonal antibody medicine, please complete a participation form through the [facility] here: MNRAP ([email or web address]). You may also call the [facility] COVID-19 Public Hotline at 1-135.524.9246 (open Mon-Fri: 9am-7pm and Sat: 10am-6pm).     Not all people who are eligible will receive the medicine, since supply is limited. You will be contacted in the next 1 to 2 business days only if you are selected. If you do not receive a call, you have not been selected to receive the medicine. If you have any questions about this medication, please contact your primary care provider. For more information, see [email or web address]      Get lots of rest. Drink extra fluids (unless a doctor has told you not to)    Take Tylenol (acetaminophen) or ibuprofen for fever or  pain. If you have liver or kidney problems, ask your family doctor if it's okay to take Tylenol o ibuprofen    Take over the counter medications for your symptoms, as directed by your doctor. You may also talk to your pharmacist.      If you have other health problems (like cancer, heart failure, an organ transplant or severe kidney disease): Call your specialty clinic if you don't feel better in the next 2 days.    Know when to call 911. Emergency warning signs include:  o Trouble breathing or shortness of breath  o Pain or pressure in the chest that doesn't go away  o Feeling confused like you haven't felt before, or not being able to wake up  o Bluish-colored lips or face    Where can I get more information?    [facility] - About COVID-19: [email or web address]     CDC - What to Do If You're Sick:     www.cdc.gov/coronavirus/2019-ncov/about/steps-when-sick.html    CDC - Ending Home Isolation:  https://www.cdc.gov/coronavirus/2019-ncov/your-health/quarantine-isolation.html    CDC - Caring for Someone:  www.cdc.gov/coronavirus/2019-ncov/if-you-are-sick/care-for-someone.html    [facility] clinical trials (COVID-19 research studies): [email or web address]-clinical-trials    Below are the COVID-19 hotlines at the [facility] of Health ([facility]). Interpreters are available.  o For health questions: Call 141-078-3753 or 1-181.369.1537 (7 a.m. to 7 p.m.)  o For questions about schools and childcare: Call 229-668-0066 or 1-364.112.5382 (7 a.m. to 7 p.m.)  January 6, 2022  RE:  Hortensia Umaña                                                                                                                  [address] [place] [address]      To whom it may concern:    I evaluated Hortensia Umaña on January 6, 2022. Hortensia LEAH Leeroy should be excused from work/school.     They should let their workplace manager and staffing office know when their quarantine ends.    We can not give an  exact date as it depends on the information below. They can calculate this on their own or work with their manager/staffing office to calculate this. (For example if they were exposed on 10/04, they would have to quarantine for 14 full days. That would be through 10/18. They could return on 10/19.)    Quarantine Guidelines:    If patient receives a positive COVID-19 test result, they should follow the guidance of those who are giving the results. Usually the return to work is 10 (or in some cases 20 days from symptom onset.) If they work at [employer], they must be cleared by Employee Occupational Health and Safety to return to work.      If patient receives a negative COVID-19 test result and did not have a high risk exposure to someone with a known positive COVID-19 test, they can return to work once they're free of fever for 24 hours without fever-reducing medication and their symptoms are improving or resolved.    If patient receives a negative COVID-19 test and if they had a high risk exposure to someone who has tested positive for COVID-19 then they can return to work 14 days after their last contact with the positive individual    Note: If there is ongoing exposure to the covid positive person, this quarantine period may be longer than 14 days. (For example, if they are continually exposed to their child, who tested positive and cannot isolate from them, then the quarantine of 7-14 days can't start until their child is no longer contagious. This is typically 10 days from onset to the child's symptoms. So the total duration may be 17-24 days in this case.)     Sincerely,  Suzanne Olivarez NP          o

## 2022-01-28 ENCOUNTER — NURSE TRIAGE (OUTPATIENT)
Dept: NURSING | Facility: CLINIC | Age: 22
End: 2022-01-28
Payer: COMMERCIAL

## 2022-01-29 NOTE — TELEPHONE ENCOUNTER
Pt called in states she has headache.  Pt has stomach pain.  The Pt took ibuprofen 200 mg 11:am today.  The Pt took Advil also.  tylenol 1000 mg  8 PM today.  Pt took 30 ml 325/15 ml.  The disposition is to call poison control.  Care advice given per protocol.  Patient agrees with care advice given.   Agreed to call back if he has additional symptoms or questions.      Jose Holbrook Dewitt Nurse Advisor 1/28/2022 10:45 PM      Reason for Disposition    MORE THAN A DOUBLE DOSE of a prescription or over-the-counter (OTC) drug    Additional Information    Negative: Drug overdose and triager unable to answer question    Negative: Caller requesting information unrelated to medicine    Negative: Caller requesting a prescription for Strep throat and has a positive culture result    Negative: Rash while taking a medication or within 3 days of stopping it    Negative: Immunization reaction suspected    Negative: [1] Asthma and [2] having symptoms of asthma (cough, wheezing, etc.)    Negative: [1] Influenza symptoms AND [2] anti-viral med prescription request, such as Tamiflu    Negative: [1] Symptom of illness (e.g., headache, abdominal pain, earache, vomiting) AND [2] more than mild    Protocols used: MEDICATION QUESTION CALL-A-

## 2022-01-30 ENCOUNTER — HEALTH MAINTENANCE LETTER (OUTPATIENT)
Age: 22
End: 2022-01-30

## 2022-02-23 ENCOUNTER — OFFICE VISIT (OUTPATIENT)
Dept: DERMATOLOGY | Facility: CLINIC | Age: 22
End: 2022-02-23
Payer: COMMERCIAL

## 2022-02-23 VITALS — SYSTOLIC BLOOD PRESSURE: 110 MMHG | HEART RATE: 75 BPM | OXYGEN SATURATION: 100 % | DIASTOLIC BLOOD PRESSURE: 69 MMHG

## 2022-02-23 DIAGNOSIS — L70.0 ACNE VULGARIS: Primary | ICD-10-CM

## 2022-02-23 PROCEDURE — 99203 OFFICE O/P NEW LOW 30 MIN: CPT | Performed by: DERMATOLOGY

## 2022-02-23 RX ORDER — TRETINOIN 0.25 MG/G
CREAM TOPICAL AT BEDTIME
Qty: 45 G | Refills: 11 | Status: SHIPPED | OUTPATIENT
Start: 2022-02-23

## 2022-02-23 RX ORDER — DOXYCYCLINE 100 MG/1
100 CAPSULE ORAL DAILY
Qty: 60 CAPSULE | Refills: 3 | Status: SHIPPED | OUTPATIENT
Start: 2022-02-23 | End: 2022-09-27

## 2022-02-23 NOTE — PROGRESS NOTES
Hortensia Umaña is an extremely pleasant 21 year old year old female patient here today for acne on face.   .   Patient states this has been present for a while.  Patient reports the following symptoms:  pimples.  Patient reports the following previous treatments none.  These treatments did not work.  Patient reports the following modifying factors none.  Associated symptoms: none.  Patient has no other skin complaints today.  Remainder of the HPI, Meds, PMH, Allergies, FH, and SH was reviewed in chart.      Past Medical History:   Diagnosis Date     Uncomplicated asthma        Past Surgical History:   Procedure Laterality Date     LAPAROSCOPIC APPENDECTOMY N/A 11/21/2020    Procedure: APPENDECTOMY, LAPAROSCOPIC;  Surgeon: Laura Preston MD;  Location:  OR        No family history on file.    Social History     Socioeconomic History     Marital status: Single     Spouse name: Not on file     Number of children: Not on file     Years of education: Not on file     Highest education level: Not on file   Occupational History     Not on file   Tobacco Use     Smoking status: Never Smoker     Smokeless tobacco: Never Used   Substance and Sexual Activity     Alcohol use: Yes     Comment: occ     Drug use: Yes     Types: Marijuana     Sexual activity: Not on file   Other Topics Concern     Parent/sibling w/ CABG, MI or angioplasty before 65F 55M? Not Asked   Social History Narrative     Not on file     Social Determinants of Health     Financial Resource Strain: Not on file   Food Insecurity: Not on file   Transportation Needs: Not on file   Physical Activity: Not on file   Stress: Not on file   Social Connections: Not on file   Intimate Partner Violence: Not on file   Housing Stability: Not on file       Outpatient Encounter Medications as of 2/23/2022   Medication Sig Dispense Refill     cetirizine (ZYRTEC) 10 MG tablet Take 10 mg by mouth daily       COMPOUNDED NON-CONTROLLED SUBSTANCE (CMPD RX) - PHARMACY TO MIX  COMPOUNDED MEDICATION Please compound viscous lidocaine 2% and maalox in a 1:1 ratio    Please take 15 to 30 ml by mouth every 4-6 hours as needed for abdominal pain 500 mL 0     metoclopramide (REGLAN) 10 MG tablet Take 1 tablet (10 mg) by mouth 3 times daily as needed (Nausea or Vomiting) 20 tablet 0     ondansetron (ZOFRAN ODT) 4 MG ODT tab Take 1 tablet (4 mg) by mouth every 6 hours as needed for nausea or vomiting 12 tablet 0     prochlorperazine (COMPAZINE) 5 MG tablet Take 1 tablet (5 mg) by mouth every 8 hours as needed for nausea or vomiting 20 tablet 0     sucralfate (CARAFATE) 1 GM/10ML suspension Take 10 mLs (1 g) by mouth 4 times daily 420 mL 0     No facility-administered encounter medications on file as of 2/23/2022.             O:   NAD, WDWN, Alert & Oriented, Mood & Affect wnl, Vitals stable   Here today alone   General appearance normal, aramis v   Vitals stable   Alert, oriented and in no acute distress     Inflammatory papules on face      Eyes: Conjunctivae/lids:Normal     ENT: Lips, buccal mucosa, tongue: normal    MSK:Normal    Cardiovascular: peripheral edema none    Pulm: Breathing Normal    Neuro/Psych: Orientation:Alert and Orientedx3 ; Mood/Affect:normal       A/P:  1. Acne  Acne vulgaris    Pathophysiology discussed with pateint and information provided   I discussed with patient Oral Abx, Aldactone, Topical creams, light therapies and OCT  Treating acne is preventative    Acne can be effectively treated, although response may sometimes be slow.   Where possible, avoid excessively humid conditions such as a sauna, working in an unventilated kitchen or tropical vacations.   If you smoke, stop. Nicotine increases sebum retention and increased scale within the follicles, forming comedones (black and whiteheads).    Minimize the application of oils and cosmetics to the affected skin.   Abrasive skin treatments can aggravate acne.   Try not to scratch or pick the spots.   To avoid sunburn,  protect your skin outdoors using a sunscreen and protective clothing.  No relationship between particular foods and acne has been proven. However, reports suggest low glycemic and low dairy diet are helpful for some people.    May take 3-4 months to see 50% improvement  May get worse during initial phase of treatment  Tretinoin at bedtime, dryness, irritation and way to prevent discussed with patient   BPO wash daily or every other day depending on dryness  Aggressive use of bland emollients discussed with patient   Doxycycline 100mg daily GI upset, esophagitis and UV precautions discussed with patient     It was a pleasure speaking to Hortensia Umaña today.  Previous clinic notes and pertinent laboratory tests were reviewed prior to Hortensia Umaña's visit.  UV precautions reviewed with patient.  Skin care regimen reviewed with patient: Eliminate harsh soaps, i.e. Dial, zest, irsih spring; Mild soaps such as Cetaphil or Dove sensitive skin, avoid hot or cold showers, aggressive use of emollients including vanicream, cetaphil or cerave discussed with patient.    Return to clinic 3 months

## 2022-02-23 NOTE — PATIENT INSTRUCTIONS
Benzoyl peroxide wash in the morning  Tretinoin cream at bedtime (this will be a bit drying to your skin)  Take oral antibiotic with a meal

## 2022-05-21 ENCOUNTER — APPOINTMENT (OUTPATIENT)
Dept: CT IMAGING | Facility: CLINIC | Age: 22
End: 2022-05-21
Attending: EMERGENCY MEDICINE
Payer: COMMERCIAL

## 2022-05-21 ENCOUNTER — HOSPITAL ENCOUNTER (EMERGENCY)
Facility: CLINIC | Age: 22
Discharge: HOME | End: 2022-05-21
Attending: EMERGENCY MEDICINE | Admitting: EMERGENCY MEDICINE
Payer: COMMERCIAL

## 2022-05-21 VITALS
DIASTOLIC BLOOD PRESSURE: 67 MMHG | HEART RATE: 73 BPM | HEIGHT: 65 IN | WEIGHT: 140 LBS | TEMPERATURE: 98.4 F | BODY MASS INDEX: 23.32 KG/M2 | OXYGEN SATURATION: 100 % | RESPIRATION RATE: 16 BRPM | SYSTOLIC BLOOD PRESSURE: 106 MMHG

## 2022-05-21 DIAGNOSIS — R07.89 CHEST WALL PAIN: ICD-10-CM

## 2022-05-21 DIAGNOSIS — B34.9 VIRAL SYNDROME: ICD-10-CM

## 2022-05-21 LAB
FLUAV RNA SPEC QL NAA+PROBE: NEGATIVE
FLUBV RNA RESP QL NAA+PROBE: NEGATIVE
HOLD SPECIMEN: NORMAL
HOLD SPECIMEN: NORMAL
RSV RNA SPEC NAA+PROBE: NEGATIVE
SARS-COV-2 RNA RESP QL NAA+PROBE: NEGATIVE

## 2022-05-21 PROCEDURE — C9803 HOPD COVID-19 SPEC COLLECT: HCPCS

## 2022-05-21 PROCEDURE — 250N000009 HC RX 250: Performed by: EMERGENCY MEDICINE

## 2022-05-21 PROCEDURE — 99285 EMERGENCY DEPT VISIT HI MDM: CPT | Mod: 25

## 2022-05-21 PROCEDURE — 250N000011 HC RX IP 250 OP 636: Performed by: EMERGENCY MEDICINE

## 2022-05-21 PROCEDURE — 87637 SARSCOV2&INF A&B&RSV AMP PRB: CPT | Performed by: EMERGENCY MEDICINE

## 2022-05-21 PROCEDURE — 71275 CT ANGIOGRAPHY CHEST: CPT

## 2022-05-21 RX ORDER — IOPAMIDOL 755 MG/ML
58 INJECTION, SOLUTION INTRAVASCULAR ONCE
Status: COMPLETED | OUTPATIENT
Start: 2022-05-21 | End: 2022-05-21

## 2022-05-21 RX ADMIN — SODIUM CHLORIDE 85 ML: 9 INJECTION, SOLUTION INTRAVENOUS at 16:02

## 2022-05-21 RX ADMIN — IOPAMIDOL 58 ML: 755 INJECTION, SOLUTION INTRAVENOUS at 16:01

## 2022-05-21 ASSESSMENT — ENCOUNTER SYMPTOMS
COUGH: 1
DIARRHEA: 0
NAUSEA: 0
SORE THROAT: 1
FEVER: 0
MYALGIAS: 0
HEADACHES: 1
VOMITING: 0

## 2022-05-21 NOTE — DISCHARGE INSTRUCTIONS

## 2022-05-21 NOTE — ED PROVIDER NOTES
History   Chief Complaint:  Pharyngitis and Chest Pain     The history is provided by the patient.      Hortensia Umaña is a 22 year old female with a history of asthma who presents with mild sore throat and chest pain. The patient tells us that one week ago she developed an itchy throat accompanied by a dry cough and headache. Yesterday evening, the patient had a sudden onset of centralized sharp chest pain that lasted one hour; these symptoms prompted the patient to be seen at Urgent Care this morning. At , the patient received a negative strep test, EKG, a negative Xray (see results below), a pending COVID test and an elevated D dimer which subsequently prompted  to refer the patient here to the ED. While in the ED, the patient tells us that the chest pain has not returned since yesterday evening. The patient denies fever, vomiting, nausea, diarrhea or myalgia. No shortness of breath. She reports her asthma has been well controlled. She denies any other symptoms.    To note, is vaccinated and boosted against COVID-19.     XR Chest 2 Views from 05/21/2022:  FINDINGS: Two views were obtained. The lungs and costophrenic angles are clear. Heart size and pulmonary vascularity are within normal limits. There is no evidence of pneumothorax or pleural effusion. Bony thorax is unremarkable.    Review of Systems   Constitutional: Negative for fever.   HENT: Positive for sore throat.    Respiratory: Positive for cough.    Cardiovascular: Positive for chest pain.   Gastrointestinal: Negative for diarrhea, nausea and vomiting.   Musculoskeletal: Negative for myalgias.   Neurological: Positive for headaches.   All other systems reviewed and are negative.    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 Resting comfortably  Head:  Scalp, face, and head appear normal  Eyes:  Pupils are equal, round    Conjunctivae non-injected and sclerae white  ENT:    The external nose is normal    The oropharynx is normal, mucous membranes moist  Uvula is in the midline.  Scant posterior pharyngeal erythema without swelling or exudates.    Pinnae are normal  Neck:  Normal range of motion    There is no rigidity noted    Trachea is in the midline  CV:  Regular rate and rhythm     Normal S1/S2, no S3/S4    No murmur or rub. Radial pulses 2+ bilaterally.  Resp:  Lungs are clear and equal bilaterally  There is no tachypnea    No increased work of breathing    No rales, wheezing, or rhonchi  GI:  Abdomen is soft, no rigidity or guarding    No distension, or mass    No tenderness or rebound tenderness   MS:  Normal muscular tone    Symmetric motor strength    No lower extremity edema. No calf swelling or tenderness.  Skin:  No rash or acute skin lesions noted  Neuro: Awake and alert  Speech is normal and fluent  Moves all extremities spontaneously  Psych:  Normal affect. Appropriate interactions.    Emergency Department Course     Imaging:  CT Chest Pulmonary Embolism w Contrast   Final Result   IMPRESSION:   1.  No pulmonary artery embolism or thoracic aortic dissection.   2.  Findings consistent with mild nonspecific bronchiolitis which may be related to the patient's history of asthma. No focal pneumonic infiltrate or pleural effusion.        Report per radiology    Laboratory:  Labs Ordered and Resulted from Time of ED " Arrival to Time of ED Departure   INFLUENZA A/B & SARS-COV2 PCR MULTIPLEX - Normal       Result Value    Influenza A PCR Negative      Influenza B PCR Negative      RSV PCR Negative      SARS CoV2 PCR Negative        Emergency Department Course:    Reviewed:  I reviewed nursing notes, vitals, past medical history, Care Everywhere and [facility]    ED Course as of 05/21/22 1849   Sat May 21, 2022   1407 I evaluated the patient at this time   1544 I rechecked the patient at this time     Interventions:  1601 Iopamidol 58 mL IV     Disposition:  The patient was discharged to home.     Impression & Plan     Medical Decision Making:  Hortensia Umaña is a 22 year old female who presents for evaluation of an episode of atypical chest pain last night which has since resolved and has not recurred.  This occurred in the setting of URI symptoms.  Patient is vaccinated against COVID-19.  On my evaluation she is well-appearing, hemodynamically stable and afebrile.  No increased work of breathing, respiratory distress or hypoxia.  Patient was initially evaluated at urgent care.  The results of this testing was personally reviewed by myself.  She had an elevated D-dimer.  The remainder of the work-up was reassuring.  COVID-19 influenza and RSV testing in the emergency department are negative.  Given the elevated D-dimer CT of the chest was obtained and negative for pulmonary embolism evidence of acute infectious process, pneumonia, pleural effusion, pneumothorax or any other acute findings.  Ultimately I feel that the patient symptoms are due to chest wall pain in the setting of mild viral URI.  I recommended supportive care with Tylenol, ibuprofen, over-the-counter cough and cold medication and rest.  Patient was agreeable with the plan of care.  Close primary care follow-up was recommended if not improving.  Patient was discharged in stable condition.    Diagnosis:    ICD-10-CM    1. Chest wall pain  R07.89    2. Viral syndrome  B34.9         Scribe Disclosure:  I, David Barron, am serving as a scribe at 2:05 PM on 5/21/2022 to document services personally performed by Junito Spain MD, based on my observations and the provider's statements to me.            Junito Spain MD  05/21/22 7813

## 2022-05-23 ENCOUNTER — OFFICE VISIT (OUTPATIENT)
Dept: DERMATOLOGY | Facility: CLINIC | Age: 22
End: 2022-05-23
Payer: COMMERCIAL

## 2022-05-23 VITALS
HEART RATE: 77 BPM | HEIGHT: 66 IN | SYSTOLIC BLOOD PRESSURE: 102 MMHG | DIASTOLIC BLOOD PRESSURE: 55 MMHG | OXYGEN SATURATION: 99 % | BODY MASS INDEX: 22.6 KG/M2

## 2022-05-23 DIAGNOSIS — L70.0 ACNE VULGARIS: Primary | ICD-10-CM

## 2022-05-23 PROCEDURE — 99213 OFFICE O/P EST LOW 20 MIN: CPT | Performed by: DERMATOLOGY

## 2022-05-23 RX ORDER — AZITHROMYCIN 250 MG/1
TABLET, FILM COATED ORAL
Qty: 30 TABLET | Refills: 6 | Status: SHIPPED | OUTPATIENT
Start: 2022-05-23 | End: 2022-09-27

## 2022-05-23 NOTE — PROGRESS NOTES
Hortensia Umaña is an extremely pleasant 22 year old year old female patient here today for f/u acne.  SLightly better still getting lesions around mesnes.  Not using doxy upset her stomach only bpo and tretinoin.  Patient has no other skin complaints today.  Remainder of the HPI, Meds, PMH, Allergies, FH, and SH was reviewed in chart.      Past Medical History:   Diagnosis Date     Uncomplicated asthma        Past Surgical History:   Procedure Laterality Date     LAPAROSCOPIC APPENDECTOMY N/A 11/21/2020    Procedure: APPENDECTOMY, LAPAROSCOPIC;  Surgeon: Laura Preston MD;  Location:  OR        History reviewed. No pertinent family history.    Social History     Socioeconomic History     Marital status: Single     Spouse name: Not on file     Number of children: Not on file     Years of education: Not on file     Highest education level: Not on file   Occupational History     Not on file   Tobacco Use     Smoking status: Never Smoker     Smokeless tobacco: Never Used   Substance and Sexual Activity     Alcohol use: Yes     Comment: occ     Drug use: Yes     Types: Marijuana     Sexual activity: Not on file   Other Topics Concern     Parent/sibling w/ CABG, MI or angioplasty before 65F 55M? Not Asked   Social History Narrative     Not on file     Social Determinants of Health     Financial Resource Strain: Not on file   Food Insecurity: Not on file   Transportation Needs: Not on file   Physical Activity: Not on file   Stress: Not on file   Social Connections: Not on file   Intimate Partner Violence: Not on file   Housing Stability: Not on file       Outpatient Encounter Medications as of 5/23/2022   Medication Sig Dispense Refill     cetirizine (ZYRTEC) 10 MG tablet Take 10 mg by mouth daily       COMPOUNDED NON-CONTROLLED SUBSTANCE (CMPD RX) - PHARMACY TO MIX COMPOUNDED MEDICATION Please compound viscous lidocaine 2% and maalox in a 1:1 ratio    Please take 15 to 30 ml by mouth every 4-6 hours as needed for  "abdominal pain 500 mL 0     doxycycline monohydrate (MONODOX) 100 MG capsule Take 1 capsule (100 mg) by mouth daily 60 capsule 3     metoclopramide (REGLAN) 10 MG tablet Take 1 tablet (10 mg) by mouth 3 times daily as needed (Nausea or Vomiting) 20 tablet 0     ondansetron (ZOFRAN ODT) 4 MG ODT tab Take 1 tablet (4 mg) by mouth every 6 hours as needed for nausea or vomiting 12 tablet 0     prochlorperazine (COMPAZINE) 5 MG tablet Take 1 tablet (5 mg) by mouth every 8 hours as needed for nausea or vomiting 20 tablet 0     sucralfate (CARAFATE) 1 GM/10ML suspension Take 10 mLs (1 g) by mouth 4 times daily 420 mL 0     tretinoin (RETIN-A) 0.025 % external cream Apply topically At Bedtime 45 g 11     No facility-administered encounter medications on file as of 5/23/2022.             O:   NAD, WDWN, Alert & Oriented, Mood & Affect wnl, Vitals stable   Here today alone   /55   Pulse 77   Ht 1.676 m (5' 6\")   SpO2 99%   BMI 22.60 kg/m     General appearance normal   Vitals stable   Alert, oriented and in no acute distress     Comedones on face rare inflammatory papules on chin   Eyes: Conjunctivae/lids:Normal     ENT: Lips, buccal mucosa, tongue: normal    MSK:Normal    Cardiovascular: peripheral edema none    Pulm: Breathing Normal    Neuro/Psych: Orientation:Alert and Orientedx3 ; Mood/Affect:normal       A/P:  1. Acne  Adult female acne and hormones discussed with patient   Birth control and aldactone discussed with patient   She prefers different abx  Add zithromax daily  Gly sal pads  Return to clinic 3 months  It was a pleasure speaking to Hortensia Umaña today.  Previous clinic notes and pertinent laboratory tests were reviewed prior to Hortensia Umaña's visit.  UV precautions reviewed with patient.  Skin care regimen reviewed with patient: Eliminate harsh soaps, i.e. Dial, zest, irsih spring; Mild soaps such as Cetaphil or Dove sensitive skin, avoid hot or cold showers, aggressive use of emollients " including vanicream, cetaphil or cerave discussed with patient.

## 2022-05-23 NOTE — LETTER
5/23/2022         RE: Hortensia Umaña  [address] [place] [address]        Dear Colleague,    Thank you for referring your patient, Hortensia Umaña, to the [facility]. Please see a copy of my visit note below.    Hortensia Umaña is an extremely pleasant 22 year old year old female patient here today for f/u acne.  SLightly better still getting lesions around mesnes.  Not using doxy upset her stomach only bpo and tretinoin.  Patient has no other skin complaints today.  Remainder of the HPI, Meds, PMH, Allergies, FH, and SH was reviewed in chart.      Past Medical History:   Diagnosis Date     Uncomplicated asthma        Past Surgical History:   Procedure Laterality Date     LAPAROSCOPIC APPENDECTOMY N/A 11/21/2020    Procedure: APPENDECTOMY, LAPAROSCOPIC;  Surgeon: Laura Preston MD;  Location:  OR        History reviewed. No pertinent family history.    Social History     Socioeconomic History     Marital status: Single     Spouse name: Not on file     Number of children: Not on file     Years of education: Not on file     Highest education level: Not on file   Occupational History     Not on file   Tobacco Use     Smoking status: Never Smoker     Smokeless tobacco: Never Used   Substance and Sexual Activity     Alcohol use: Yes     Comment: occ     Drug use: Yes     Types: Marijuana     Sexual activity: Not on file   Other Topics Concern     Parent/sibling w/ CABG, MI or angioplasty before 65F 55M? Not Asked   Social History Narrative     Not on file     Social Determinants of Health     Financial Resource Strain: Not on file   Food Insecurity: Not on file   Transportation Needs: Not on file   Physical Activity: Not on file   Stress: Not on file   Social Connections: Not on file   Intimate Partner Violence: Not on file   Housing Stability: Not on file       Outpatient Encounter Medications as of 5/23/2022   Medication Sig Dispense Refill     cetirizine (ZYRTEC) 10 MG tablet Take 10  "mg by mouth daily       COMPOUNDED NON-CONTROLLED SUBSTANCE (CMPD RX) - PHARMACY TO MIX COMPOUNDED MEDICATION Please compound viscous lidocaine 2% and maalox in a 1:1 ratio    Please take 15 to 30 ml by mouth every 4-6 hours as needed for abdominal pain 500 mL 0     doxycycline monohydrate (MONODOX) 100 MG capsule Take 1 capsule (100 mg) by mouth daily 60 capsule 3     metoclopramide (REGLAN) 10 MG tablet Take 1 tablet (10 mg) by mouth 3 times daily as needed (Nausea or Vomiting) 20 tablet 0     ondansetron (ZOFRAN ODT) 4 MG ODT tab Take 1 tablet (4 mg) by mouth every 6 hours as needed for nausea or vomiting 12 tablet 0     prochlorperazine (COMPAZINE) 5 MG tablet Take 1 tablet (5 mg) by mouth every 8 hours as needed for nausea or vomiting 20 tablet 0     sucralfate (CARAFATE) 1 GM/10ML suspension Take 10 mLs (1 g) by mouth 4 times daily 420 mL 0     tretinoin (RETIN-A) 0.025 % external cream Apply topically At Bedtime 45 g 11     No facility-administered encounter medications on file as of 5/23/2022.             O:   NAD, WDWN, Alert & Oriented, Mood & Affect wnl, Vitals stable   Here today alone   /55   Pulse 77   Ht 1.676 m (5' 6\")   SpO2 99%   BMI 22.60 kg/m     General appearance normal   Vitals stable   Alert, oriented and in no acute distress     Comedones on face rare inflammatory papules on chin   Eyes: Conjunctivae/lids:Normal     ENT: Lips, buccal mucosa, tongue: normal    MSK:Normal    Cardiovascular: peripheral edema none    Pulm: Breathing Normal    Neuro/Psych: Orientation:Alert and Orientedx3 ; Mood/Affect:normal       A/P:  1. Acne  Adult female acne and hormones discussed with patient   Birth control and aldactone discussed with patient   She prefers different abx  Add zithromax daily  Gly sal pads  Return to clinic 3 months  It was a pleasure speaking to Hortensia Umaña today.  Previous clinic notes and pertinent laboratory tests were reviewed prior to Hortensia Umaña's visit.  UV " precautions reviewed with patient.  Skin care regimen reviewed with patient: Eliminate harsh soaps, i.e. Dial, zest, irsih spring; Mild soaps such as Cetaphil or Dove sensitive skin, avoid hot or cold showers, aggressive use of emollients including vanicream, cetaphil or cerave discussed with patient.          Again, thank you for allowing me to participate in the care of your patient.        Sincerely,        Delano Alexander MD

## 2022-05-23 NOTE — PATIENT INSTRUCTIONS
Get over the counter glycolic or salicylic acid pads to use once daily     Continue to use the tretinoin cream at bedtime    Start the new oral antibiotic. Make sure to take with food and a full glass off water.

## 2022-09-20 NOTE — TELEPHONE ENCOUNTER
DIAGNOSIS: bilat hand pain/ self ref   APPOINTMENT DATE: 9.27.22   NOTES STATUS DETAILS   MEDICATION LIST Internal

## 2022-09-24 ENCOUNTER — HEALTH MAINTENANCE LETTER (OUTPATIENT)
Age: 22
End: 2022-09-24

## 2022-09-26 NOTE — PROGRESS NOTES
"ASSESSMENT/PLAN:    (M77.8) Tendonitis of both wrists  (primary encounter diagnosis)  Comment: exam consistent w/ overuse tendonitis of wrists and hands; we discussed the option of checking x-rays and or labwork but deferred for now; will refer to hand therapy and have her use voltaren gel prn; f/u in 6 wks; if no better, would check x-rays  Plan: Hand Therapy Referral          (M77.8) Tendinitis of both hands  Comment: see above  Plan: Hand Therapy Referral          Attestation:  This patient has been seen and evaluated by me, Franck Kelley MD with the resident, Dr Conchita Gonzales and Dr Harshad Bahena and the care team. I agree with the findings and plan of care as documented in this note.    Franck Kelley MD  September 27, 2022  10:07 AM      Pt is a 22 year old female here today for:     Bilateral Wrist/ Hand pain:   Location: dorsal hands and wrists  Duration: started in high school, worsening especially with typing (pharmacy tech), was a drummer until a few years ago  Trauma/ Fall? Left hand trauma 10 years ago - fourth and fifth metacarpals  Swelling? none  Numbness/ Tingling? No, but \"burning\" in wrist   Weakness? none   Imaging? Left hand x-ray at time of injury 10 years ago - not available in Epic  Treatment? None - sometimes rotating her wrist makes a \"pop\" and then has some relief   NO morning stiffness    Family history:  Rheumatoid arthritis - grandmother  Osteoarthritis - mother  Multiple Sclerosis - cousin  DM1 - distant family members (great aunts/uncles)    Patient Active Problem List   Diagnosis Code     Environmental allergies Z91.09     Pulmonary nodule R91.1     Past Medical History:   Diagnosis Date     Uncomplicated asthma       Past Surgical History:   Procedure Laterality Date     LAPAROSCOPIC APPENDECTOMY N/A 11/21/2020    Procedure: APPENDECTOMY, LAPAROSCOPIC;  Surgeon: Laura Preston MD;  Location: [facility]      Current Outpatient Medications   Medication Sig Dispense Refill     tretinoin " "(RETIN-A) 0.025 % external cream Apply topically At Bedtime 45 g 11     azithromycin (ZITHROMAX) 250 MG tablet One tablets daily 30 tablet 6     cetirizine (ZYRTEC) 10 MG tablet Take 10 mg by mouth daily       COMPOUNDED NON-CONTROLLED SUBSTANCE (CMPD RX) - PHARMACY TO MIX COMPOUNDED MEDICATION Please compound viscous lidocaine 2% and maalox in a 1:1 ratio    Please take 15 to 30 ml by mouth every 4-6 hours as needed for abdominal pain 500 mL 0     doxycycline monohydrate (MONODOX) 100 MG capsule Take 1 capsule (100 mg) by mouth daily 60 capsule 3     metoclopramide (REGLAN) 10 MG tablet Take 1 tablet (10 mg) by mouth 3 times daily as needed (Nausea or Vomiting) 20 tablet 0     ondansetron (ZOFRAN ODT) 4 MG ODT tab Take 1 tablet (4 mg) by mouth every 6 hours as needed for nausea or vomiting 12 tablet 0     prochlorperazine (COMPAZINE) 5 MG tablet Take 1 tablet (5 mg) by mouth every 8 hours as needed for nausea or vomiting 20 tablet 0     sucralfate (CARAFATE) 1 GM/10ML suspension Take 10 mLs (1 g) by mouth 4 times daily 420 mL 0      Allergies   Allergen Reactions     Hydrocodone Hives     Seasonal Allergies      Nuts Rash      ROS:   Gen- no fevers/chills   Rheum - no morning stiffness   Derm - no rash/ redness   Neuro - no numbness, no tingling   Remainder of ROS negative.     Exam:   Ht 1.676 m (5' 6\")   Wt 63.5 kg (140 lb)   BMI 22.60 kg/m       Bilateral WRIST/HAND:   Inspection: Swelling - no; Atrophy - no   Sensation: intact in median, radial, ulnar distribution   ROM:   Wrist: flexion- full/ extension- full/ pronation- full/ supination- full;   radial deviation - full; ulnar deviation- full   Hand: Full flexion at PIP/DIP, finger abduction/ adduction.   Strength: 5/5 in all motions   Bony tenderness:   Wrist: Carpal bones: yes right, no left; Snuffbox: Yes right, no left   Hand: Metacarpals: no; Phalanges: no   Tendons: FDS/FDP/Extensor mechanism intact; mild pain w/ resisted thumb abduction on " R  Maneuvers: -Tinel's/Phalen; -Finkelstein.   Other: No nodules palpated in palmar aspect.

## 2022-09-27 ENCOUNTER — OFFICE VISIT (OUTPATIENT)
Dept: ORTHOPEDICS | Facility: CLINIC | Age: 22
End: 2022-09-27
Payer: COMMERCIAL

## 2022-09-27 ENCOUNTER — PRE VISIT (OUTPATIENT)
Dept: ORTHOPEDICS | Facility: CLINIC | Age: 22
End: 2022-09-27

## 2022-09-27 VITALS — BODY MASS INDEX: 22.5 KG/M2 | HEIGHT: 66 IN | WEIGHT: 140 LBS

## 2022-09-27 DIAGNOSIS — M77.8 TENDONITIS OF BOTH WRISTS: Primary | ICD-10-CM

## 2022-09-27 DIAGNOSIS — M77.8 TENDINITIS OF BOTH HANDS: ICD-10-CM

## 2022-09-27 PROCEDURE — 99203 OFFICE O/P NEW LOW 30 MIN: CPT | Mod: GC | Performed by: FAMILY MEDICINE

## 2022-09-27 NOTE — LETTER
"  9/27/2022      RE: Hortensia Umaña  [address] [facility] [address]     Dear Colleague,    Thank you for referring your patient, Hortensia Umaña, to the [facility] [place]. Please see a copy of my visit note below.    ASSESSMENT/PLAN:    (M77.8) Tendonitis of both wrists  (primary encounter diagnosis)  Comment: exam consistent w/ overuse tendonitis of wrists and hands; we discussed the option of checking x-rays and or labwork but deferred for now; will refer to hand therapy and have her use voltaren gel prn; f/u in 6 wks; if no better, would check x-rays  Plan: Hand Therapy Referral          (M77.8) Tendinitis of both hands  Comment: see above  Plan: Hand Therapy Referral          Attestation:  This patient has been seen and evaluated by me, Franck Kelley MD with the resident, Dr Conchita Gonzales and Dr Harshad Bahena and the care team. I agree with the findings and plan of care as documented in this note.    Franck Kelley MD  September 27, 2022  10:07 AM      Pt is a 22 year old female here today for:     Bilateral Wrist/ Hand pain:   Location: dorsal hands and wrists  Duration: started in high school, worsening especially with typing (pharmacy tech), was a drummer until a few years ago  Trauma/ Fall? Left hand trauma 10 years ago - fourth and fifth metacarpals  Swelling? none  Numbness/ Tingling? No, but \"burning\" in wrist   Weakness? none   Imaging? Left hand x-ray at time of injury 10 years ago - not available in Epic  Treatment? None - sometimes rotating her wrist makes a \"pop\" and then has some relief   NO morning stiffness    Family history:  Rheumatoid arthritis - grandmother  Osteoarthritis - mother  Multiple Sclerosis - cousin  DM1 - distant family members (great aunts/uncles)    Patient Active Problem List   Diagnosis Code     Environmental allergies Z91.09     Pulmonary nodule R91.1     Past Medical History:   Diagnosis Date     Uncomplicated asthma       Past Surgical " "History:   Procedure Laterality Date     LAPAROSCOPIC APPENDECTOMY N/A 11/21/2020    Procedure: APPENDECTOMY, LAPAROSCOPIC;  Surgeon: Laura Preston MD;  Location:  OR      Current Outpatient Medications   Medication Sig Dispense Refill     tretinoin (RETIN-A) 0.025 % external cream Apply topically At Bedtime 45 g 11     azithromycin (ZITHROMAX) 250 MG tablet One tablets daily 30 tablet 6     cetirizine (ZYRTEC) 10 MG tablet Take 10 mg by mouth daily       COMPOUNDED NON-CONTROLLED SUBSTANCE (CMPD RX) - PHARMACY TO MIX COMPOUNDED MEDICATION Please compound viscous lidocaine 2% and maalox in a 1:1 ratio    Please take 15 to 30 ml by mouth every 4-6 hours as needed for abdominal pain 500 mL 0     doxycycline monohydrate (MONODOX) 100 MG capsule Take 1 capsule (100 mg) by mouth daily 60 capsule 3     metoclopramide (REGLAN) 10 MG tablet Take 1 tablet (10 mg) by mouth 3 times daily as needed (Nausea or Vomiting) 20 tablet 0     ondansetron (ZOFRAN ODT) 4 MG ODT tab Take 1 tablet (4 mg) by mouth every 6 hours as needed for nausea or vomiting 12 tablet 0     prochlorperazine (COMPAZINE) 5 MG tablet Take 1 tablet (5 mg) by mouth every 8 hours as needed for nausea or vomiting 20 tablet 0     sucralfate (CARAFATE) 1 GM/10ML suspension Take 10 mLs (1 g) by mouth 4 times daily 420 mL 0      Allergies   Allergen Reactions     Hydrocodone Hives     Seasonal Allergies      Nuts Rash      ROS:   Gen- no fevers/chills   Rheum - no morning stiffness   Derm - no rash/ redness   Neuro - no numbness, no tingling   Remainder of ROS negative.     Exam:   Ht 1.676 m (5' 6\")   Wt 63.5 kg (140 lb)   BMI 22.60 kg/m       Bilateral WRIST/HAND:   Inspection: Swelling - no; Atrophy - no   Sensation: intact in median, radial, ulnar distribution   ROM:   Wrist: flexion- full/ extension- full/ pronation- full/ supination- full;   radial deviation - full; ulnar deviation- full   Hand: Full flexion at PIP/DIP, finger abduction/ adduction. "   Strength: 5/5 in all motions   Bony tenderness:   Wrist: Carpal bones: yes right, no left; Snuffbox: Yes right, no left   Hand: Metacarpals: no; Phalanges: no   Tendons: FDS/FDP/Extensor mechanism intact; mild pain w/ resisted thumb abduction on R  Maneuvers: -Tinel's/Phalen; -Finkelstein.   Other: No nodules palpated in palmar aspect.           Again, thank you for allowing me to participate in the care of your patient.      Sincerely,    Fracnk Kelley MD

## 2022-09-27 NOTE — LETTER
Date:September 28, 2022      Patient was self referred, no letter generated. Do not send.        [facility] Health Information

## 2022-11-07 ENCOUNTER — TELEPHONE (OUTPATIENT)
Dept: ORTHOPEDICS | Facility: CLINIC | Age: 22
End: 2022-11-07

## 2022-11-07 NOTE — TELEPHONE ENCOUNTER
Called patient to inform him of a change in appointment from 11/8 at 9:40a  to 11/10 at 9am. If not avaiable to reschedule. Left phone number to reschedule

## 2023-03-25 ENCOUNTER — NURSE TRIAGE (OUTPATIENT)
Dept: NURSING | Facility: CLINIC | Age: 23
End: 2023-03-25
Payer: COMMERCIAL

## 2023-03-25 NOTE — TELEPHONE ENCOUNTER
UTI noon.  Bladder pain.    Reason for Disposition    Patient sounds very sick or weak to the triager    Additional Information    Negative: Shock suspected (e.g., cold/pale/clammy skin, too weak to stand, low BP, rapid pulse)    Negative: Sounds like a life-threatening emergency to the triager    Negative: Followed a female genital area injury (e.g., vagina, vulva)    Negative: Followed a male genital area injury (e.g., penis, scrotum)    Negative: Vaginal discharge    Negative: Pus (white, yellow) or bloody discharge from end of penis    Negative: [1] Taking antibiotic for urinary tract infection (UTI) AND [2] female    Negative: [1] Taking antibiotic for urinary tract infection (UTI) AND [2] male    Negative: [1] Pain or burning with passing urine (urination) AND [2] pregnant    Negative: [1] Pain or burning with passing urine (urination) AND [2] postpartum (from 0 to 6 weeks after delivery)    Negative: [1] Pain or burning with passing urine (urination) AND [2] female    Negative: [1] Pain or burning with passing urine (urination) AND [2] male    Negative: Pain or itching in the vulvar area    Negative: Pain in scrotum is main symptom    Negative: Blood in the urine is main symptom    Negative: Symptoms arising from use of a urinary catheter (e.g., coude, Contreras)    Negative: [1] Unable to urinate (or only a few drops) > 4 hours AND [2] bladder feels very full (e.g., palpable bladder or strong urge to urinate)    Negative: [1] Decreased urination and [2] drinking very little AND [2] dehydration suspected (e.g., dark urine, no urine > 12 hours, very dry mouth, very lightheaded)    Protocols used: URINARY SYMPTOMS-A-  Nurse Triage SBAR    Is this a 2nd Level Triage? NO    Situation:   Pt has been having lower bilateral back pain and not able to void completely since noon today.      Background:   No fever able to void a sm. amount    Assessment:   Urine is a little darker mouth is moist    Protocol Recommended  Disposition:   Go to ED Now (Or PCP Triage)    Recommendation:   go to ER    Radha Proctor RN on 3/25/2023 at 12:46 AM

## 2023-06-04 ENCOUNTER — HEALTH MAINTENANCE LETTER (OUTPATIENT)
Age: 23
End: 2023-06-04

## 2023-10-24 ENCOUNTER — HOSPITAL ENCOUNTER (EMERGENCY)
Facility: CLINIC | Age: 23
Discharge: HOME | End: 2023-10-24
Attending: STUDENT IN AN ORGANIZED HEALTH CARE EDUCATION/TRAINING PROGRAM | Admitting: STUDENT IN AN ORGANIZED HEALTH CARE EDUCATION/TRAINING PROGRAM
Payer: COMMERCIAL

## 2023-10-24 ENCOUNTER — APPOINTMENT (OUTPATIENT)
Dept: CT IMAGING | Facility: CLINIC | Age: 23
End: 2023-10-24
Attending: STUDENT IN AN ORGANIZED HEALTH CARE EDUCATION/TRAINING PROGRAM
Payer: COMMERCIAL

## 2023-10-24 ENCOUNTER — NURSE TRIAGE (OUTPATIENT)
Dept: NURSING | Facility: CLINIC | Age: 23
End: 2023-10-24
Payer: COMMERCIAL

## 2023-10-24 VITALS
DIASTOLIC BLOOD PRESSURE: 64 MMHG | OXYGEN SATURATION: 100 % | SYSTOLIC BLOOD PRESSURE: 111 MMHG | HEIGHT: 66 IN | WEIGHT: 150 LBS | TEMPERATURE: 98.7 F | HEART RATE: 58 BPM | BODY MASS INDEX: 24.11 KG/M2 | RESPIRATION RATE: 16 BRPM

## 2023-10-24 DIAGNOSIS — N10 ACUTE PYELONEPHRITIS: ICD-10-CM

## 2023-10-24 LAB
ALBUMIN SERPL BCG-MCNC: 4.2 G/DL (ref 3.5–5.2)
ALBUMIN UR-MCNC: 10 MG/DL
ALP SERPL-CCNC: 91 U/L (ref 35–104)
ALT SERPL W P-5'-P-CCNC: 15 U/L (ref 0–50)
AMORPH CRY #/AREA URNS HPF: ABNORMAL /HPF
ANION GAP SERPL CALCULATED.3IONS-SCNC: 11 MMOL/L (ref 7–15)
APPEARANCE UR: ABNORMAL
AST SERPL W P-5'-P-CCNC: 23 U/L (ref 0–45)
BASOPHILS # BLD AUTO: 0.1 10E3/UL (ref 0–0.2)
BASOPHILS NFR BLD AUTO: 1 %
BILIRUB SERPL-MCNC: 0.3 MG/DL
BILIRUB UR QL STRIP: NEGATIVE
BUN SERPL-MCNC: 14.3 MG/DL (ref 6–20)
CALCIUM SERPL-MCNC: 9.2 MG/DL (ref 8.6–10)
CHLORIDE SERPL-SCNC: 106 MMOL/L (ref 98–107)
COLOR UR AUTO: ABNORMAL
CREAT SERPL-MCNC: 0.68 MG/DL (ref 0.51–0.95)
DEPRECATED HCO3 PLAS-SCNC: 24 MMOL/L (ref 22–29)
EGFRCR SERPLBLD CKD-EPI 2021: >90 ML/MIN/1.73M2
EOSINOPHIL # BLD AUTO: 0.4 10E3/UL (ref 0–0.7)
EOSINOPHIL NFR BLD AUTO: 6 %
ERYTHROCYTE [DISTWIDTH] IN BLOOD BY AUTOMATED COUNT: 12.9 % (ref 10–15)
FLUAV RNA SPEC QL NAA+PROBE: NEGATIVE
FLUBV RNA RESP QL NAA+PROBE: NEGATIVE
GLUCOSE SERPL-MCNC: 99 MG/DL (ref 70–99)
GLUCOSE UR STRIP-MCNC: NEGATIVE MG/DL
HCG UR QL: NEGATIVE
HCT VFR BLD AUTO: 40 % (ref 35–47)
HGB BLD-MCNC: 13.2 G/DL (ref 11.7–15.7)
HGB UR QL STRIP: NEGATIVE
IMM GRANULOCYTES # BLD: 0 10E3/UL
IMM GRANULOCYTES NFR BLD: 0 %
KETONES UR STRIP-MCNC: NEGATIVE MG/DL
LEUKOCYTE ESTERASE UR QL STRIP: ABNORMAL
LIPASE SERPL-CCNC: 44 U/L (ref 13–60)
LYMPHOCYTES # BLD AUTO: 1.4 10E3/UL (ref 0.8–5.3)
LYMPHOCYTES NFR BLD AUTO: 20 %
MCH RBC QN AUTO: 31.1 PG (ref 26.5–33)
MCHC RBC AUTO-ENTMCNC: 33 G/DL (ref 31.5–36.5)
MCV RBC AUTO: 94 FL (ref 78–100)
MONOCYTES # BLD AUTO: 0.5 10E3/UL (ref 0–1.3)
MONOCYTES NFR BLD AUTO: 7 %
NEUTROPHILS # BLD AUTO: 4.6 10E3/UL (ref 1.6–8.3)
NEUTROPHILS NFR BLD AUTO: 66 %
NITRATE UR QL: NEGATIVE
NRBC # BLD AUTO: 0 10E3/UL
NRBC BLD AUTO-RTO: 0 /100
PH UR STRIP: 8 [PH] (ref 5–7)
PLATELET # BLD AUTO: 300 10E3/UL (ref 150–450)
POTASSIUM SERPL-SCNC: 4.5 MMOL/L (ref 3.4–5.3)
PROT SERPL-MCNC: 7.4 G/DL (ref 6.4–8.3)
RBC # BLD AUTO: 4.24 10E6/UL (ref 3.8–5.2)
RBC URINE: 1 /HPF
RSV RNA SPEC NAA+PROBE: NEGATIVE
SARS-COV-2 RNA RESP QL NAA+PROBE: NEGATIVE
SODIUM SERPL-SCNC: 141 MMOL/L (ref 135–145)
SP GR UR STRIP: 1.02 (ref 1–1.03)
SQUAMOUS EPITHELIAL: 1 /HPF
UROBILINOGEN UR STRIP-MCNC: NORMAL MG/DL
WBC # BLD AUTO: 7.1 10E3/UL (ref 4–11)
WBC URINE: 12 /HPF

## 2023-10-24 PROCEDURE — 36415 COLL VENOUS BLD VENIPUNCTURE: CPT | Performed by: EMERGENCY MEDICINE

## 2023-10-24 PROCEDURE — 80053 COMPREHEN METABOLIC PANEL: CPT | Performed by: STUDENT IN AN ORGANIZED HEALTH CARE EDUCATION/TRAINING PROGRAM

## 2023-10-24 PROCEDURE — 83690 ASSAY OF LIPASE: CPT | Performed by: STUDENT IN AN ORGANIZED HEALTH CARE EDUCATION/TRAINING PROGRAM

## 2023-10-24 PROCEDURE — 258N000003 HC RX IP 258 OP 636: Performed by: STUDENT IN AN ORGANIZED HEALTH CARE EDUCATION/TRAINING PROGRAM

## 2023-10-24 PROCEDURE — 81025 URINE PREGNANCY TEST: CPT | Performed by: STUDENT IN AN ORGANIZED HEALTH CARE EDUCATION/TRAINING PROGRAM

## 2023-10-24 PROCEDURE — 250N000009 HC RX 250: Performed by: STUDENT IN AN ORGANIZED HEALTH CARE EDUCATION/TRAINING PROGRAM

## 2023-10-24 PROCEDURE — 96361 HYDRATE IV INFUSION ADD-ON: CPT

## 2023-10-24 PROCEDURE — 83690 ASSAY OF LIPASE: CPT | Performed by: EMERGENCY MEDICINE

## 2023-10-24 PROCEDURE — 85025 COMPLETE CBC W/AUTO DIFF WBC: CPT | Performed by: EMERGENCY MEDICINE

## 2023-10-24 PROCEDURE — 96375 TX/PRO/DX INJ NEW DRUG ADDON: CPT

## 2023-10-24 PROCEDURE — 74177 CT ABD & PELVIS W/CONTRAST: CPT

## 2023-10-24 PROCEDURE — 81001 URINALYSIS AUTO W/SCOPE: CPT | Performed by: EMERGENCY MEDICINE

## 2023-10-24 PROCEDURE — 80053 COMPREHEN METABOLIC PANEL: CPT | Performed by: EMERGENCY MEDICINE

## 2023-10-24 PROCEDURE — 99285 EMERGENCY DEPT VISIT HI MDM: CPT | Mod: 25

## 2023-10-24 PROCEDURE — 87086 URINE CULTURE/COLONY COUNT: CPT | Performed by: STUDENT IN AN ORGANIZED HEALTH CARE EDUCATION/TRAINING PROGRAM

## 2023-10-24 PROCEDURE — 87637 SARSCOV2&INF A&B&RSV AMP PRB: CPT | Performed by: STUDENT IN AN ORGANIZED HEALTH CARE EDUCATION/TRAINING PROGRAM

## 2023-10-24 PROCEDURE — 81025 URINE PREGNANCY TEST: CPT | Performed by: EMERGENCY MEDICINE

## 2023-10-24 PROCEDURE — 250N000011 HC RX IP 250 OP 636: Performed by: STUDENT IN AN ORGANIZED HEALTH CARE EDUCATION/TRAINING PROGRAM

## 2023-10-24 PROCEDURE — 96365 THER/PROPH/DIAG IV INF INIT: CPT | Mod: 59

## 2023-10-24 PROCEDURE — 85025 COMPLETE CBC W/AUTO DIFF WBC: CPT | Performed by: STUDENT IN AN ORGANIZED HEALTH CARE EDUCATION/TRAINING PROGRAM

## 2023-10-24 PROCEDURE — 81001 URINALYSIS AUTO W/SCOPE: CPT | Performed by: STUDENT IN AN ORGANIZED HEALTH CARE EDUCATION/TRAINING PROGRAM

## 2023-10-24 PROCEDURE — 250N000011 HC RX IP 250 OP 636: Mod: JZ | Performed by: STUDENT IN AN ORGANIZED HEALTH CARE EDUCATION/TRAINING PROGRAM

## 2023-10-24 RX ORDER — SULFAMETHOXAZOLE/TRIMETHOPRIM 800-160 MG
1 TABLET ORAL 2 TIMES DAILY
Qty: 20 TABLET | Refills: 0 | Status: SHIPPED | OUTPATIENT
Start: 2023-10-24 | End: 2023-11-03

## 2023-10-24 RX ORDER — ONDANSETRON 4 MG/1
4 TABLET, ORALLY DISINTEGRATING ORAL EVERY 8 HOURS PRN
Qty: 12 TABLET | Refills: 0 | Status: SHIPPED | OUTPATIENT
Start: 2023-10-24

## 2023-10-24 RX ORDER — ONDANSETRON 2 MG/ML
4 INJECTION INTRAMUSCULAR; INTRAVENOUS ONCE
Status: COMPLETED | OUTPATIENT
Start: 2023-10-24 | End: 2023-10-24

## 2023-10-24 RX ORDER — KETOROLAC TROMETHAMINE 15 MG/ML
15 INJECTION, SOLUTION INTRAMUSCULAR; INTRAVENOUS ONCE
Status: COMPLETED | OUTPATIENT
Start: 2023-10-24 | End: 2023-10-24

## 2023-10-24 RX ORDER — CEFTRIAXONE 1 G/1
1 INJECTION, POWDER, FOR SOLUTION INTRAMUSCULAR; INTRAVENOUS ONCE
Status: COMPLETED | OUTPATIENT
Start: 2023-10-24 | End: 2023-10-24

## 2023-10-24 RX ORDER — IOPAMIDOL 755 MG/ML
75 INJECTION, SOLUTION INTRAVASCULAR ONCE
Status: COMPLETED | OUTPATIENT
Start: 2023-10-24 | End: 2023-10-24

## 2023-10-24 RX ADMIN — ONDANSETRON 4 MG: 2 INJECTION INTRAMUSCULAR; INTRAVENOUS at 07:31

## 2023-10-24 RX ADMIN — IOPAMIDOL 75 ML: 755 INJECTION, SOLUTION INTRAVENOUS at 08:18

## 2023-10-24 RX ADMIN — KETOROLAC TROMETHAMINE 15 MG: 15 INJECTION, SOLUTION INTRAMUSCULAR; INTRAVENOUS at 07:31

## 2023-10-24 RX ADMIN — SODIUM CHLORIDE 1000 ML: 9 INJECTION, SOLUTION INTRAVENOUS at 07:34

## 2023-10-24 RX ADMIN — CEFTRIAXONE SODIUM 1 G: 1 INJECTION, POWDER, FOR SOLUTION INTRAMUSCULAR; INTRAVENOUS at 09:18

## 2023-10-24 RX ADMIN — SODIUM CHLORIDE 62 ML: 9 INJECTION, SOLUTION INTRAVENOUS at 08:18

## 2023-10-24 ASSESSMENT — ACTIVITIES OF DAILY LIVING (ADL)
ADLS_ACUITY_SCORE: 35
ADLS_ACUITY_SCORE: 35

## 2023-10-24 NOTE — ED PROVIDER NOTES
History     Chief Complaint:  Abdominal Pain    The history is provided by the patient.      Hortensia Umaña is a 23 year old female presenting with abdominal pain, nausea, and diarrhea. Upon onset, she was eating a [employer] sandwich and fries at work. She originally attributed her symptoms to the food and tried to use the bathroom to alleviate symptoms. She initially thought it was gas. Hortensia began experiencing diarrhea and crampy pain with bowel movements. Pain radiates around her abdomen from LLQ to epigastric region. She had a headache that has since resolved. She tried to take Gas-X, Pepto bismol, and Excedrin with no relief. She denies regular use of Excedrin or ibuprofen. She tried sleeping but woke up feeling worse. She reports darker diarrhea, and is unsure if blood was present. Her abdominal pain began at the lower region, but has since radiated upward and to her back. Additionally, she notes increased belching. No current fever, cough, or vomiting. She recalls having similar pain previously, but it was not as severe as today. Hortensia denies experiencing recurrent diarrhea, dramatic weight loss, or vaginal discharge. Denies history of crohn's disease, ulcerative colitis, or ovarian cysts. Additionally, she denies drinking or drug use besides marijuana. Of note, LMP started Thursday, 10/19/23 and ended Sunday, 10/22/23. The patient reports a past appendectomy and acid reflux she is treating with famotidine. No history of kidney stones. Hortensia notes that her sister has history of gallbladder issues.    Independent Historian:   Partner was at bedside and corroborates the above.     Review of External Notes:   None     Medications:    Famotidine   Sertraline   Escitalopram   Loratadine   Hydroxyzine   Citalopram   Azelastine   Ondansetron   Metoclopramide HCl  Olopatadine   Valacyclovir     Past Medical History:    Asthma  Depression   Anxiety  Allergies  Pulmonary nodule  Leukopenia   Neutropenia   Sleeping  "difficulty     Past Surgical History:    Appendectomy      Physical Exam   Patient Vitals for the past 24 hrs:   BP Temp Temp src Pulse Resp SpO2 Height Weight   10/24/23 0930 111/64 -- -- 58 -- 100 % -- --   10/24/23 0605 108/66 -- -- -- 16 -- -- --   10/24/23 0604 108/66 -- -- 67 -- 99 % -- --   10/24/23 0433 108/70 98.7  F (37.1  C) Oral 75 14 99 % 1.676 m (5' 6\") 68 kg (150 lb)      Physical Exam  General: Awake, alert, in no acute distress   HEENT: Atraumatic   EOM normal   External ears normal   Trachea midline  Neck: Supple, normal ROM  CV: Regular rate, regular rhythm   No murmur   No lower extremity edema  2+ radial and DP pulses  PULM: Breath sounds normal bilaterally  No wheezes or rales  ABD: Soft, epigastric and LLQ tenderness, bilateral flank tenderness, non-distended  Normal bowel sounds   No rebound or guarding   MSK: No gross deformities  NEURO: Alert, no focal deficits  Skin: Warm, dry and intact    Emergency Department Course     Imaging:  CT Abdomen Pelvis w Contrast   Preliminary Result   IMPRESSION:    1.  No convincing acute process within the abdomen or pelvis.   2.  Prior appendectomy.         Laboratory:  Labs Ordered and Resulted from Time of ED Arrival to Time of ED Departure   ROUTINE UA WITH MICROSCOPIC REFLEX TO CULTURE - Abnormal       Result Value    Color Urine Orange (*)     Appearance Urine Cloudy (*)     Glucose Urine Negative      Bilirubin Urine Negative      Ketones Urine Negative      Specific Gravity Urine 1.024      Blood Urine Negative      pH Urine 8.0 (*)     Protein Albumin Urine 10 (*)     Urobilinogen Urine Normal      Nitrite Urine Negative      Leukocyte Esterase Urine Small (*)     Amorphous Crystals Urine Few (*)     RBC Urine 1      WBC Urine 12 (*)     Squamous Epithelials Urine 1     COMPREHENSIVE METABOLIC PANEL - Normal    Sodium 141      Potassium 4.5      Carbon Dioxide (CO2) 24      Anion Gap 11      Urea Nitrogen 14.3      Creatinine 0.68      GFR " Estimate >90      Calcium 9.2      Chloride 106      Glucose 99      Alkaline Phosphatase 91      AST 23      ALT 15      Protein Total 7.4      Albumin 4.2      Bilirubin Total 0.3     HCG QUALITATIVE URINE - Normal    hCG Urine Qualitative Negative     LIPASE - Normal    Lipase 44     INFLUENZA A/B, RSV, & SARS-COV2 PCR - Normal    Influenza A PCR Negative      Influenza B PCR Negative      RSV PCR Negative      SARS CoV2 PCR Negative     CBC WITH PLATELETS AND DIFFERENTIAL    WBC Count 7.1      RBC Count 4.24      Hemoglobin 13.2      Hematocrit 40.0      MCV 94      MCH 31.1      MCHC 33.0      RDW 12.9      Platelet Count 300      % Neutrophils 66      % Lymphocytes 20      % Monocytes 7      % Eosinophils 6      % Basophils 1      % Immature Granulocytes 0      NRBCs per 100 WBC 0      Absolute Neutrophils 4.6      Absolute Lymphocytes 1.4      Absolute Monocytes 0.5      Absolute Eosinophils 0.4      Absolute Basophils 0.1      Absolute Immature Granulocytes 0.0      Absolute NRBCs 0.0     URINE CULTURE     Emergency Department Course & Assessments:    Interventions:  Medications   ketorolac (TORADOL) injection 15 mg (15 mg Intravenous $Given 10/24/23 0731)   ondansetron (ZOFRAN) injection 4 mg (4 mg Intravenous $Given 10/24/23 0731)   sodium chloride 0.9% BOLUS 1,000 mL (0 mLs Intravenous Stopped 10/24/23 0952)   iopamidol (ISOVUE-370) solution 75 mL (75 mLs Intravenous $Given 10/24/23 0818)   SalineFlush (62 mLs Intravenous $Given 10/24/23 0818)   cefTRIAXone (ROCEPHIN) 1 g vial to attach to  mL bag for ADULTS or NS 50 mL bag for PEDS (0 g Intravenous Stopped 10/24/23 0952)      Independent Interpretation (X-rays, CTs, rhythm strip):  None    Assessments/Consultations/Discussion of Management or Tests:  ED Course as of 10/24/23 1131   Tue Oct 24, 2023   0708 I obtained the history and examined the patient as noted above.      0850 I rechecked and updated the patient.        Social Determinants of  Health affecting care:   None    Disposition:  The patient was discharged to home.     Impression & Plan      Medical Decision Making:  Vitals WNL on arrival  Patient presents with nonspecific nausea, vomiting, 1 episode of loose stool  Considered broad differential including diverticulitis, PID, gastroenteritis, food poisoning  Work-up in the ED is reassuring with normal white blood cell count, electrolytes  Urine looks infected with 13 white - Retrospectively she states that she has had history of pyelonephritis in the past and this feels similar  In the setting of flank tenderness, will treat her for acute pyelonephritis  There is no kidney stone, renal abscess  There is no ovarian cyst on ultrasound  First dose of Rocephin in ED  Rx sent to pharmacy  Patient is tolerating p.o. and feels improved after the above interventions  Discharge home with strict return precautions for worsening signs of infection otherwise PCP follow-up  All questions answered. All results reviewed. Patient voices understanding and agreement of this plan.      Diagnosis:    ICD-10-CM    1. Acute pyelonephritis  N10            Discharge Medications:  Discharge Medication List as of 10/24/2023  9:53 AM        START taking these medications    Details   ondansetron (ZOFRAN ODT) 4 MG ODT tab Take 1 tablet (4 mg) by mouth every 8 hours as needed for nausea, Disp-12 tablet, R-0, E-Prescribe      sulfamethoxazole-trimethoprim (BACTRIM DS) 800-160 MG tablet Take 1 tablet by mouth 2 times daily for 10 days, Disp-20 tablet, R-0, E-Prescribe            Scribe Disclosure:  Devante PINTO, am serving as a scribe at 10:51 AM on 10/24/2023    Vanita PINTO, am serving as a scribe at 10:51 AM on 10/24/2023 to document services personally performed by Deandra Wang DO, based on my observations and the provider's statements to me.   10/24/2023   Deandra Wang DO Pappas Richter, Ellen, DO  10/24/23 1134

## 2023-10-24 NOTE — TELEPHONE ENCOUNTER
Nurse Triage SBAR    Is this a 2nd Level Triage? NO    Situation: Chest pain (main concern)    Background: Diarrhea, headache, back pain and stomach ache started last night. Patient took Pepto, Tylenol, and gas-x then went to bed. Now woke with pain in middle chest, hurts to breath, headache has worsened, stomach ache has worsened, and back is extreme pain reports she can barely move.     Assessment: Constant chest pain rated at 7 on scale of 0-10. Breathing makes the pain worse. Moving also makes the pain worse. Reports she also has pain in jaw, states it started in the jaw/teeth before she started getting the headache. Reports feeling lightheaded.     Protocol Recommended Disposition:   No disposition on file.    Recommendation: Patient will have another adult drive her to the [facility] ED now. Reviewed reasons to call 911. Patient agrees with plan.      Leah Kimball RN on 10/24/2023 at 4:17 AM      Reason for Disposition   Dizziness or lightheadedness    Additional Information   Negative: SEVERE difficulty breathing (e.g., struggling for each breath, speaks in single words)   Negative: Difficult to awaken or acting confused (e.g., disoriented, slurred speech)   Negative: Shock suspected (e.g., cold/pale/clammy skin, too weak to stand, low BP, rapid pulse)   Negative: Passed out (i.e., lost consciousness, collapsed and was not responding)   Negative: Chest pain lasting longer than 5 minutes and ANY of the following:    history of heart disease  (i.e., heart attack, bypass surgery, angina, angioplasty, CHF; not just a heart murmur)    described as crushing, pressure-like, or heavy    age > 50    age > 30 AND at least one cardiac risk factor (i.e., hypertension, diabetes, obesity, smoker or strong family history of heart disease)    not relieved with nitroglycerin   Negative: Heart beating < 50 beats per minute OR > 140 beats per minute   Negative: Visible sweat on face or sweat dripping down face    "Negative: Sounds like a life-threatening emergency to the triager   Negative: Followed a chest injury   Negative: SEVERE chest pain   Negative: [1] Chest pain (or \"angina\") comes and goes AND [2] is happening more often (increasing in frequency) or getting worse (increasing in severity)  (Exception: Chest pains that last only a few seconds.)   Negative: Pain also in shoulder(s) or arm(s) or jaw  (Exception: Pain is clearly made worse by movement.)   Negative: Difficulty breathing    Protocols used: Chest Pain-A-AH    "

## 2023-10-24 NOTE — ED TRIAGE NOTES
Patient states back pain & abdominal pain.  Stomach pain started at 5pm.  Thought it was gas pains.  Took gas x, pepto, tylenol.  Not helping.       Triage Assessment (Adult)       Row Name 10/24/23 0433          Triage Assessment    Airway WDL WDL        Respiratory WDL    Respiratory WDL WDL        Cardiac WDL    Cardiac WDL WDL        Peripheral/Neurovascular WDL    Peripheral Neurovascular WDL WDL        Cognitive/Neuro/Behavioral WDL    Cognitive/Neuro/Behavioral WDL WDL

## 2023-10-25 LAB — BACTERIA UR CULT: NORMAL

## 2023-10-26 NOTE — ED PROVIDER NOTES
Patient called asking for alternative to Bactrim due to loss of appetite and nausea.  I reviewed chart and note that the urine culture is negative.  UA was equivocal, and patient also was not having any dysuria or frequency suggestive of UTI.  I spoke with patient over the phone and discussed that given the urine culture is negative, that I doubt that symptoms are due to pyelonephritis, and that it is okay to stop the Bactrim.  I would not prescribe alternative at this time.  She is already prescribed Zofran.  She voiced understanding.  All questions answered.       Sravan Rodriguez MD  10/26/23 1600

## 2024-07-14 ENCOUNTER — HEALTH MAINTENANCE LETTER (OUTPATIENT)
Age: 24
End: 2024-07-14

## 2025-07-19 ENCOUNTER — HEALTH MAINTENANCE LETTER (OUTPATIENT)
Age: 25
End: 2025-07-19

## (undated) DEVICE — SU VICRYL 0 UR-6 27" J603H

## (undated) DEVICE — GLOVE PROTEXIS BLUE W/NEU-THERA 6.5  2D73EB65

## (undated) DEVICE — ESU LIGASURE MARYLAND LAPAROSCOPIC SLR/DVDR 5MMX37CM LF1937

## (undated) DEVICE — SUCTION IRR STRYKERFLOW II W/TIP 250-070-520

## (undated) DEVICE — STPL RELOAD REG TISSUE ECHELON 45 X 3.6MM BLUE GST45B

## (undated) DEVICE — ESU GROUND PAD UNIVERSAL W/O CORD

## (undated) DEVICE — GLOVE PROTEXIS MICRO 6.0  2D73PM60

## (undated) DEVICE — SUCTION CANISTER MEDIVAC LINER 3000ML W/LID 65651-530

## (undated) DEVICE — STPL ENDO RELOAD ECHELON 45X2.0MM GREY ECR45M

## (undated) DEVICE — ESU HOLDER LAP INST DISP PURPLE LONG 330MM H-PRO-330

## (undated) DEVICE — SU MONOCRYL 4-0 PS-2 18" UND Y496G

## (undated) DEVICE — ENDO TROCAR FIRST ENTRY KII FIOS Z-THRD 05X100MM CTF03

## (undated) DEVICE — SOL NACL 0.9% INJ 1000ML BAG 2B1324X

## (undated) DEVICE — BLADE CLIPPER 4406

## (undated) DEVICE — SOL WATER IRRIG 1000ML BOTTLE 2F7114

## (undated) DEVICE — ENDO SCOPE WARMER LF TM500

## (undated) DEVICE — DEVICE SUTURE GRASPER TROCAR CLOSURE 14GA PMITCSG

## (undated) DEVICE — LINEN TOWEL PACK X5 5464

## (undated) DEVICE — PACK LAP CHOLE SLC15LCFSD

## (undated) DEVICE — SYSTEM CLEARIFY VISUALIZATION 21-345

## (undated) DEVICE — ENDO TROCAR SLEEVE KII Z-THREADED 05X100MM CTS02

## (undated) DEVICE — PREP CHLORAPREP 26ML TINTED ORANGE  260815

## (undated) DEVICE — ENDO TROCAR FIRST ENTRY KII FIOS Z-THRD 12X100MM CTF73

## (undated) RX ORDER — BUPIVACAINE HYDROCHLORIDE AND EPINEPHRINE 2.5; 5 MG/ML; UG/ML
INJECTION, SOLUTION EPIDURAL; INFILTRATION; INTRACAUDAL; PERINEURAL
Status: DISPENSED
Start: 2020-11-21

## (undated) RX ORDER — LIDOCAINE HYDROCHLORIDE 10 MG/ML
INJECTION, SOLUTION INFILTRATION; PERINEURAL
Status: DISPENSED
Start: 2020-11-21

## (undated) RX ORDER — ONDANSETRON 2 MG/ML
INJECTION INTRAMUSCULAR; INTRAVENOUS
Status: DISPENSED
Start: 2020-11-21

## (undated) RX ORDER — FENTANYL CITRATE 0.05 MG/ML
INJECTION, SOLUTION INTRAMUSCULAR; INTRAVENOUS
Status: DISPENSED
Start: 2020-11-21

## (undated) RX ORDER — PROPOFOL 10 MG/ML
INJECTION, EMULSION INTRAVENOUS
Status: DISPENSED
Start: 2020-11-21

## (undated) RX ORDER — HYDROCODONE BITARTRATE AND ACETAMINOPHEN 5; 325 MG/1; MG/1
TABLET ORAL
Status: DISPENSED
Start: 2020-11-21

## (undated) RX ORDER — FENTANYL CITRATE 50 UG/ML
INJECTION, SOLUTION INTRAMUSCULAR; INTRAVENOUS
Status: DISPENSED
Start: 2020-11-21